# Patient Record
Sex: MALE | Race: OTHER | HISPANIC OR LATINO | Employment: OTHER | URBAN - METROPOLITAN AREA
[De-identification: names, ages, dates, MRNs, and addresses within clinical notes are randomized per-mention and may not be internally consistent; named-entity substitution may affect disease eponyms.]

---

## 2017-05-30 ENCOUNTER — HOSPITAL ENCOUNTER (EMERGENCY)
Facility: HOSPITAL | Age: 37
Discharge: HOME | End: 2017-05-30
Attending: EMERGENCY MEDICINE | Admitting: EMERGENCY MEDICINE
Payer: MEDICARE

## 2017-05-30 ENCOUNTER — APPOINTMENT (EMERGENCY)
Dept: RADIOLOGY | Facility: HOSPITAL | Age: 37
End: 2017-05-30
Payer: MEDICARE

## 2017-05-30 ENCOUNTER — APPOINTMENT (EMERGENCY)
Dept: CT IMAGING | Facility: HOSPITAL | Age: 37
End: 2017-05-30
Payer: MEDICARE

## 2017-05-30 VITALS
RESPIRATION RATE: 16 BRPM | DIASTOLIC BLOOD PRESSURE: 64 MMHG | TEMPERATURE: 98.3 F | OXYGEN SATURATION: 98 % | WEIGHT: 165 LBS | HEART RATE: 73 BPM | SYSTOLIC BLOOD PRESSURE: 111 MMHG

## 2017-05-30 DIAGNOSIS — T07.XXXA MULTIPLE CONTUSIONS: Primary | ICD-10-CM
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
## 2017-05-30 LAB
ANION GAP BLD CALC-SCNC: 17 MMOL/L (ref 4–13)
BUN BLD-MCNC: 9 MG/DL (ref 5–25)
CA-I BLD-SCNC: 1.17 MMOL/L (ref 1.12–1.32)
CHLORIDE BLD-SCNC: 99 MMOL/L (ref 100–108)
CREAT BLD-MCNC: 1 MG/DL (ref 0.6–1.3)
GFR SERPL CREATININE-BSD FRML MDRD: >60 ML/MIN/1.73SQ M
GLUCOSE SERPL-MCNC: 80 MG/DL (ref 65–140)
HCT VFR BLD CALC: 40 % (ref 36.5–49.3)
HGB BLDA-MCNC: 13.6 G/DL (ref 12–17)
PCO2 BLD: 30 MMOL/L (ref 21–32)
POTASSIUM BLD-SCNC: 3.6 MMOL/L (ref 3.5–5.3)
SODIUM BLD-SCNC: 141 MMOL/L (ref 136–145)
SPECIMEN SOURCE: ABNORMAL

## 2017-05-30 PROCEDURE — 73090 X-RAY EXAM OF FOREARM: CPT

## 2017-05-30 PROCEDURE — 70450 CT HEAD/BRAIN W/O DYE: CPT

## 2017-05-30 PROCEDURE — 96374 THER/PROPH/DIAG INJ IV PUSH: CPT

## 2017-05-30 PROCEDURE — 73610 X-RAY EXAM OF ANKLE: CPT

## 2017-05-30 PROCEDURE — 80047 BASIC METABLC PNL IONIZED CA: CPT

## 2017-05-30 PROCEDURE — 72125 CT NECK SPINE W/O DYE: CPT

## 2017-05-30 PROCEDURE — 96361 HYDRATE IV INFUSION ADD-ON: CPT

## 2017-05-30 PROCEDURE — 73560 X-RAY EXAM OF KNEE 1 OR 2: CPT

## 2017-05-30 PROCEDURE — 99284 EMERGENCY DEPT VISIT MOD MDM: CPT

## 2017-05-30 PROCEDURE — 74177 CT ABD & PELVIS W/CONTRAST: CPT

## 2017-05-30 PROCEDURE — 71260 CT THORAX DX C+: CPT

## 2017-05-30 PROCEDURE — 85014 HEMATOCRIT: CPT

## 2017-05-30 RX ORDER — KETOROLAC TROMETHAMINE 30 MG/ML
30 INJECTION, SOLUTION INTRAMUSCULAR; INTRAVENOUS ONCE
Status: COMPLETED | OUTPATIENT
Start: 2017-05-30 | End: 2017-05-30

## 2017-05-30 RX ORDER — DICLOFENAC SODIUM 25 MG/1
25 TABLET, DELAYED RELEASE ORAL 2 TIMES DAILY PRN
Qty: 15 TABLET | Refills: 0 | Status: SHIPPED | OUTPATIENT
Start: 2017-05-30 | End: 2020-06-16

## 2017-05-30 RX ORDER — TRAMADOL HYDROCHLORIDE 50 MG/1
50 TABLET ORAL EVERY 6 HOURS PRN
Qty: 15 TABLET | Refills: 0 | Status: SHIPPED | OUTPATIENT
Start: 2017-05-30 | End: 2017-05-30 | Stop reason: ALTCHOICE

## 2017-05-30 RX ADMIN — IOHEXOL 100 ML: 350 INJECTION, SOLUTION INTRAVENOUS at 19:49

## 2017-05-30 RX ADMIN — KETOROLAC TROMETHAMINE 30 MG: 30 INJECTION, SOLUTION INTRAMUSCULAR at 19:57

## 2017-05-30 RX ADMIN — SODIUM CHLORIDE 1000 ML: 0.9 INJECTION, SOLUTION INTRAVENOUS at 19:57

## 2018-09-12 ENCOUNTER — HOSPITAL ENCOUNTER (EMERGENCY)
Facility: HOSPITAL | Age: 38
Discharge: HOME | End: 2018-09-12
Attending: EMERGENCY MEDICINE
Payer: COMMERCIAL

## 2018-09-12 VITALS
RESPIRATION RATE: 18 BRPM | SYSTOLIC BLOOD PRESSURE: 136 MMHG | WEIGHT: 155 LBS | TEMPERATURE: 97.6 F | BODY MASS INDEX: 28.35 KG/M2 | DIASTOLIC BLOOD PRESSURE: 78 MMHG | HEART RATE: 88 BPM | OXYGEN SATURATION: 98 %

## 2018-09-12 DIAGNOSIS — S61.211A LACERATION OF LEFT INDEX FINGER: Primary | ICD-10-CM

## 2018-09-12 DIAGNOSIS — S61.215A LACERATION OF LEFT RING FINGER: ICD-10-CM

## 2018-09-12 PROCEDURE — 99282 EMERGENCY DEPT VISIT SF MDM: CPT

## 2018-09-13 NOTE — ED PROVIDER NOTES
History  Chief Complaint   Patient presents with    Hand Laceration     Pt states "I cut my 2 fingers with a knife" left 3rd and 4th digit  last tetanus a month ago     41 yom with very superficial laceraiton to left 3/4 fingers on volar aspect  A/P: lac, tetanus up to date  Close with steri strips glue  Laceration   Location:  Finger  Finger laceration location:  L middle finger and L ring finger  Length:  2 cm each  Depth:  Cutaneous  Bleeding: controlled    Laceration mechanism:  Metal edge  Pain details:     Quality:  Sharp    Severity:  Mild    Timing:  Constant    Progression:  Unchanged  Foreign body present:  No foreign bodies  Relieved by:  Nothing  Ineffective treatments:  None tried  Tetanus status:  Up to date  Associated symptoms: no fever and no rash        Prior to Admission Medications   Prescriptions Last Dose Informant Patient Reported? Taking? cyclobenzaprine (FLEXERIL) 10 mg tablet   No No   Sig: Take 1 tablet (10 mg total) by mouth 3 (three) times a day as needed for muscle spasms for up to 12 doses  diclofenac (VOLTAREN) 25 MG EC tablet   No No   Sig: Take 1 tablet by mouth 2 (two) times a day as needed (pain)      Facility-Administered Medications: None       Past Medical History:   Diagnosis Date    Asthma     Back pain        Past Surgical History:   Procedure Laterality Date    NO PAST SURGERIES         History reviewed  No pertinent family history  I have reviewed and agree with the history as documented  Social History   Substance Use Topics    Smoking status: Current Every Day Smoker     Packs/day: 0 00     Types: Cigarettes    Smokeless tobacco: Never Used    Alcohol use Yes        Review of Systems   Constitutional: Negative for chills, fatigue and fever  Eyes: Negative for photophobia and visual disturbance  Respiratory: Negative for cough and shortness of breath  Cardiovascular: Negative for chest pain, palpitations and leg swelling  Gastrointestinal: Negative for diarrhea, nausea and vomiting  Endocrine: Negative for polydipsia and polyuria  Genitourinary: Negative for decreased urine volume, difficulty urinating, dysuria and frequency  Musculoskeletal: Negative for back pain, neck pain and neck stiffness  Skin: Positive for wound  Negative for color change and rash  Allergic/Immunologic: Negative for environmental allergies and immunocompromised state  Neurological: Negative for dizziness and headaches  Hematological: Negative for adenopathy  Does not bruise/bleed easily  Psychiatric/Behavioral: Negative for dysphoric mood  The patient is not nervous/anxious  Physical Exam  Physical Exam   Constitutional: He is oriented to person, place, and time  He appears well-developed  HENT:   Head: Normocephalic and atraumatic  Right Ear: External ear normal    Left Ear: External ear normal    Mouth/Throat: Oropharynx is clear and moist    Eyes: Conjunctivae and EOM are normal  Pupils are equal, round, and reactive to light  Neck: Normal range of motion  Neck supple  No JVD present  No thyromegaly present  Cardiovascular: Normal rate, regular rhythm and normal heart sounds  Exam reveals no gallop and no friction rub  No murmur heard  Pulmonary/Chest: Effort normal and breath sounds normal  No respiratory distress  He has no wheezes  He has no rales  Abdominal: Soft  Bowel sounds are normal  He exhibits no distension  There is no rebound and no guarding  Musculoskeletal: Normal range of motion  He exhibits no edema  1 cm laceration on distal portion volar aspect horizonatlly on 3/4th left digits  Not through dermis   Lymphadenopathy:     He has no cervical adenopathy  Neurological: He is alert and oriented to person, place, and time  No cranial nerve deficit  Skin: Skin is warm  Psychiatric: He has a normal mood and affect  His behavior is normal    Nursing note and vitals reviewed        Vital Signs  ED Triage Vitals [09/12/18 2303]   Temperature Pulse Respirations Blood Pressure SpO2   97 6 °F (36 4 °C) 88 18 136/78 98 %      Temp src Heart Rate Source Patient Position - Orthostatic VS BP Location FiO2 (%)   -- -- -- -- --      Pain Score       3           Vitals:    09/12/18 2303   BP: 136/78   Pulse: 88       Visual Acuity      ED Medications  Medications - No data to display    Diagnostic Studies  Results Reviewed     None                 No orders to display              Procedures  Lac Repair  Date/Time: 9/12/2018 11:53 AM  Performed by: Holli Cornelius by: Jalen May   Consent: Verbal consent obtained    Risks and benefits: risks, benefits and alternatives were discussed  Patient understanding: patient states understanding of the procedure being performed  Patient identity confirmed: verbally with patient  Body area: upper extremity  Location details: left hand  Laceration length: 3 (3) cm  Tendon involvement: none  Nerve involvement: none  Vascular damage: no    Wound Dehiscence:  Superficial Wound Dehiscence: simple closure      Procedure Details:  Irrigation solution: saline  Amount of cleaning: standard  Debridement: none  Degree of undermining: none  Skin closure: Steri-Strips and glue  Number of sutures: 2  Approximation: close  Approximation difficulty: simple  Patient tolerance: Patient tolerated the procedure well with no immediate complications             Phone Contacts  ED Phone Contact    ED Course                               MDM  Number of Diagnoses or Management Options  Laceration of left index finger: new and requires workup  Laceration of left ring finger: new and requires workup  Patient Progress  Patient progress: stable    CritCare Time    Disposition  Final diagnoses:   Laceration of left index finger   Laceration of left ring finger     Time reflects when diagnosis was documented in both MDM as applicable and the Disposition within this note     Time User Action Codes Description Comment    9/12/2018 11:20 PM Honey Moise Add [B79 609E] Laceration of left index finger     9/12/2018 11:20 PM Honey Moise Add [G70 [id number]] Laceration of left ring finger       ED Disposition     ED Disposition Condition Comment    Discharge  Keo Irving discharge to home/self care  Condition at discharge: Good        Follow-up Information    None         Discharge Medication List as of 9/12/2018 11:21 PM      CONTINUE these medications which have NOT CHANGED    Details   cyclobenzaprine (FLEXERIL) 10 mg tablet Take 1 tablet (10 mg total) by mouth 3 (three) times a day as needed for muscle spasms for up to 12 doses  , Starting 7/19/2016, Until Discontinued, Print      diclofenac (VOLTAREN) 25 MG EC tablet Take 1 tablet by mouth 2 (two) times a day as needed (pain), Starting 5/30/2017, Until Discontinued, Print           No discharge procedures on file      ED Provider  Electronically Signed by           Eliana English DO  09/14/18 6068

## 2018-09-13 NOTE — DISCHARGE INSTRUCTIONS
Finger Laceration   WHAT YOU NEED TO KNOW:   A finger laceration is a deep cut in your skin  It is often caused by a sharp object, such as a knife, or blunt force to your finger  Your blood vessels, bones, joints, tendons, or nerves may also be injured  DISCHARGE INSTRUCTIONS:   Return to the emergency department if:   · Your wound comes apart  · Blood soaks through your bandage  · You have severe pain in your finger or hand  · Your finger is pale and cold  · You have sudden trouble moving your finger  · Your swelling suddenly gets worse  · You have red streaks on your skin coming from your wound  Contact your healthcare provider or hand specialist if:   · You have new numbness or tingling  · Your finger feels warm, looks swollen or red, and is draining pus  · You have a fever  · You have questions or concerns about your condition or care  Medicines: You may  need any of the following:  · Antibiotics  help prevent a bacterial infection  · Acetaminophen  decreases pain and fever  It is available without a doctor's order  Ask how much to take and how often to take it  Follow directions  Read the labels of all other medicines you are using to see if they also contain acetaminophen, or ask your doctor or pharmacist  Acetaminophen can cause liver damage if not taken correctly  Do not use more than 4 grams (4,000 milligrams) total of acetaminophen in one day  · Prescription pain medicine  may be given  Ask your healthcare provider how to take this medicine safely  Some prescription pain medicines contain acetaminophen  Do not take other medicines that contain acetaminophen without talking to your healthcare provider  Too much acetaminophen may cause liver damage  Prescription pain medicine may cause constipation  Ask your healthcare provider how to prevent or treat constipation  · Take your medicine as directed    Contact your healthcare provider if you think your medicine is not helping or if you have side effects  Tell him or her if you are allergic to any medicine  Keep a list of the medicines, vitamins, and herbs you take  Include the amounts, and when and why you take them  Bring the list or the pill bottles to follow-up visits  Carry your medicine list with you in case of an emergency  Self-care:   · Apply ice  on your finger for 15 to 20 minutes every hour or as directed  Use an ice pack, or put crushed ice in a plastic bag  Cover it with a towel before you apply it to your skin  Ice helps prevent tissue damage and decreases swelling and pain  · Elevate  your hand above the level of your heart as often as you can  This will help decrease swelling and pain  Prop your hand on pillows or blankets to keep it elevated comfortably  · Wear your splint as directed  A splint will decrease movement and stress on your wound  The splint may help your wound heal faster  Ask your healthcare provider how to apply and remove a splint  · Apply ointments to decrease scarring  Do not apply ointments until your healthcare provider says it is okay  You may need to wait until your wound is healed  Ask which ointment to buy and how often to use it  Wound care:   · Do not get your wound wet until your healthcare provider says it is okay  Do not soak your hand in water  Do not go swimming until your healthcare provider says it is okay  When your healthcare provider says it is okay, carefully wash around the wound with soap and water  Let soap and water run over your wound  Gently pat the area dry or allow it to air dry  · Change your bandages when they get wet, dirty, or after washing  Apply new, clean bandages as directed  Do not apply elastic bandages or tape too tightly  Do not put powders or lotions on your wound  · Apply antibiotic ointment as directed  Your healthcare provider may give you antibiotic ointment to put over your wound if you have stitches   If you have Strips-Strips over your wound, let them dry up and fall off on their own  If they do not fall off within 14 days, gently remove them  If you have glue over your wound, do not remove or pick at it  If your glue comes off, do not replace it with glue that you have at home  · Check your wound every day for signs of infection  Signs of infection include swelling, redness, or pus  Follow up with your healthcare provider or hand specialist in 2 days:  Write down your questions so you remember to ask them during your visits  © 2017 [employer]  Information is for End User's use only and may not be sold, redistributed or otherwise used for commercial purposes  All illustrations and images included in CareNotes® are the copyrighted property of [employer] , [employer]  or Jairo Rodriguez  The above information is an  only  It is not intended as medical advice for individual conditions or treatments  Talk to your doctor, nurse or pharmacist before following any medical regimen to see if it is safe and effective for you

## 2019-02-14 ENCOUNTER — HOSPITAL ENCOUNTER (EMERGENCY)
Facility: HOSPITAL | Age: 39
Discharge: HOME | End: 2019-02-14
Attending: EMERGENCY MEDICINE
Payer: COMMERCIAL

## 2019-02-14 VITALS
SYSTOLIC BLOOD PRESSURE: 134 MMHG | RESPIRATION RATE: 18 BRPM | BODY MASS INDEX: 28.35 KG/M2 | DIASTOLIC BLOOD PRESSURE: 78 MMHG | HEART RATE: 87 BPM | TEMPERATURE: 98.4 F | OXYGEN SATURATION: 94 % | WEIGHT: 154.98 LBS

## 2019-02-14 DIAGNOSIS — J40 BRONCHITIS: Primary | ICD-10-CM

## 2019-02-14 DIAGNOSIS — J45.901 ASTHMA EXACERBATION: ICD-10-CM

## 2019-02-14 PROCEDURE — 99283 EMERGENCY DEPT VISIT LOW MDM: CPT

## 2019-02-14 PROCEDURE — 94640 AIRWAY INHALATION TREATMENT: CPT

## 2019-02-14 RX ORDER — ALBUTEROL SULFATE 2.5 MG/3ML
2.5 SOLUTION RESPIRATORY (INHALATION) ONCE
Status: COMPLETED | OUTPATIENT
Start: 2019-02-14 | End: 2019-02-14

## 2019-02-14 RX ORDER — BENZONATATE 100 MG/1
100 CAPSULE ORAL EVERY 8 HOURS
Qty: 21 CAPSULE | Refills: 0 | Status: SHIPPED | OUTPATIENT
Start: 2019-02-14 | End: 2020-06-16

## 2019-02-14 RX ORDER — GUAIFENESIN 100 MG/5ML
200 SYRUP ORAL 3 TIMES DAILY PRN
Qty: 120 ML | Refills: 0 | Status: SHIPPED | OUTPATIENT
Start: 2019-02-14 | End: 2019-02-24

## 2019-02-14 RX ORDER — AZITHROMYCIN 250 MG/1
TABLET, FILM COATED ORAL
Qty: 6 TABLET | Refills: 0 | Status: SHIPPED | OUTPATIENT
Start: 2019-02-14 | End: 2019-02-18

## 2019-02-14 RX ORDER — MAGNESIUM HYDROXIDE/ALUMINUM HYDROXICE/SIMETHICONE 120; 1200; 1200 MG/30ML; MG/30ML; MG/30ML
30 SUSPENSION ORAL ONCE
Status: COMPLETED | OUTPATIENT
Start: 2019-02-14 | End: 2019-02-14

## 2019-02-14 RX ADMIN — ALBUTEROL SULFATE 2.5 MG: 2.5 SOLUTION RESPIRATORY (INHALATION) at 21:34

## 2019-02-14 RX ADMIN — ALUMINUM HYDROXIDE, MAGNESIUM HYDROXIDE, AND SIMETHICONE 30 ML: 200; 200; 20 SUSPENSION ORAL at 22:44

## 2019-02-14 RX ADMIN — LIDOCAINE HYDROCHLORIDE 15 ML: 20 SOLUTION ORAL; TOPICAL at 22:44

## 2019-02-14 RX ADMIN — IPRATROPIUM BROMIDE 0.5 MG: 0.5 SOLUTION RESPIRATORY (INHALATION) at 21:34

## 2019-02-15 NOTE — ED PROVIDER NOTES
History  Chief Complaint   Patient presents with    URI     pt reports cough, congestion and chest pain with cough for a few weeks now, pt seen at urgent cares and PCP for same and dx with "bronchitis, pneumonia or whooping cough", appears in no distress     71-year-old male presents the ER with his wife for chest pain, chest tightness, wheezing, cough, headaches, dizziness, subjective fevers the patient states started a few weeks ago  Patient has a history of asthma and states that he has been using his inhaler more and was given a prescription for a nebulizer to use every 4 hours as needed due to his wheezing, chest tightness, cough and chest pain that started since he became sick  Patient was seen by his PCP and also at Urgent Care on February 12 and diagnosed with bronchitis  Wife states that at that time he was checked for strep throat and flu and both were negative  Patient was also seen this morning at [facility] and had a workup which included an EKG, chest x-ray, pertussis lab, CBC, CMP, troponin  Wife states that all the labs were negative and that pneumonia and will being cough or ruled out  Wife states that he has been taking approximately 3 days of prednisone and using his inhalers and nebulizers  Patient was prescribed Bactrim this morning from [facility] and wife states that she has not filled that prescription at this time and that she question the doctor on why he was giving a Bactrim prescription and is unsure of what is being covered  Patient's wife states that she would does work as a nurse  Wife states that patient has been taking OTC medicine for symptom relief without much improvement  Wife states the patient is a smoker but has had decreased his smoking since his symptoms began  Wife states that she is concerned because patient has had symptoms for several weeks without much improvement and states that he is not acting like himself    Patient does have prescription for as escitalopram but wife states that he does not take it on a regular basis only when needed  Prior to Admission Medications   Prescriptions Last Dose Informant Patient Reported? Taking? cyclobenzaprine (FLEXERIL) 10 mg tablet   No No   Sig: Take 1 tablet (10 mg total) by mouth 3 (three) times a day as needed for muscle spasms for up to 12 doses  diclofenac (VOLTAREN) 25 MG EC tablet   No No   Sig: Take 1 tablet by mouth 2 (two) times a day as needed (pain)      Facility-Administered Medications: None       Past Medical History:   Diagnosis Date    Asthma     Back pain     Psychiatric disorder        Past Surgical History:   Procedure Laterality Date    HAND SURGERY      NO PAST SURGERIES         History reviewed  No pertinent family history  I have reviewed and agree with the history as documented  Social History     Tobacco Use    Smoking status: Current Every Day Smoker     Packs/day: 0 00     Types: Cigarettes    Smokeless tobacco: Never Used   Substance Use Topics    Alcohol use: Yes    Drug use: Yes     Types: Marijuana        Review of Systems   Constitutional: Positive for chills and fever  HENT: Positive for congestion, rhinorrhea and sore throat  Respiratory: Positive for cough, chest tightness, shortness of breath and wheezing  Cardiovascular: Positive for chest pain  Negative for palpitations  Gastrointestinal: Negative for abdominal pain, nausea and vomiting  Skin: Negative for rash  Neurological: Positive for dizziness and headaches  Negative for weakness, light-headedness and numbness  All other systems reviewed and are negative  Physical Exam  Physical Exam   Constitutional: He is oriented to person, place, and time  He appears well-developed and well-nourished  He appears distressed  HENT:   Head: Normocephalic and atraumatic     Right Ear: Tympanic membrane normal    Left Ear: Tympanic membrane normal    Nose: Mucosal edema and rhinorrhea present  Mouth/Throat: Uvula is midline, oropharynx is clear and moist and mucous membranes are normal    Eyes: Pupils are equal, round, and reactive to light  Conjunctivae and lids are normal    Neck: Normal range of motion  Cardiovascular: Normal rate, regular rhythm, normal heart sounds and intact distal pulses  Pulmonary/Chest: Effort normal  He has wheezes  Abdominal: Soft  Bowel sounds are normal  There is no tenderness  Patient did not have any abdominal pain on exam but after breathing treatment stated that he felt an epigastric pain at the top of the abdomen  Pain not reproducible with palpation, GI cocktail given to patient and noted improvement but patient approximately 15 minutes after treatment was given  Musculoskeletal: Normal range of motion  Neurological: He is alert and oriented to person, place, and time  He has normal strength  No cranial nerve deficit or sensory deficit  GCS eye subscore is 4  GCS verbal subscore is 5  GCS motor subscore is 6  Skin: Skin is warm, dry and intact  Capillary refill takes less than 2 seconds  No rash noted  He is not diaphoretic  Nursing note and vitals reviewed        Vital Signs  ED Triage Vitals [02/14/19 2048]   Temperature Pulse Respirations Blood Pressure SpO2   98 4 °F (36 9 °C) 87 18 134/78 94 %      Temp src Heart Rate Source Patient Position - Orthostatic VS BP Location FiO2 (%)   -- -- -- -- --      Pain Score       --           Vitals:    02/14/19 2048   BP: 134/78   Pulse: 87       Visual Acuity      ED Medications  Medications   albuterol inhalation solution 2 5 mg (2 5 mg Nebulization Given 2/14/19 2134)   ipratropium (ATROVENT) 0 02 % inhalation solution 0 5 mg (0 5 mg Nebulization Given 2/14/19 2134)   aluminum-magnesium hydroxide-simethicone (MYLANTA) 200-200-20 mg/5 mL oral suspension 30 mL (30 mL Oral Given 2/14/19 2244)   lidocaine viscous (XYLOCAINE) 2 % mucosal solution 15 mL (15 mL Swish & Swallow Given 2/14/19 2244)       Diagnostic Studies  Results Reviewed     None                 No orders to display              Procedures  Procedures       Phone Contacts  ED Phone Contact    ED Course  ED Course as of Feb 18 0536   Thu Feb 14, 2019   2313 Pt states that the GI cocktail improved his stomach pain  MDM  Number of Diagnoses or Management Options  Asthma exacerbation: new and does not require workup  Bronchitis: new and does not require workup  Diagnosis management comments: Have and x-ray reviewed from [facility] notes  Discussed with patient and his wife that due to full workup done that morning no lab work or repeat x-rays will beDone at this time  Discussed with patient and his wife that if any symptoms worsen or new symptoms develop they should return to the ER for further evaluation  Patient and his wife verbalized understanding of instructions  Patient will be given a prescription for azithromycin to cover lungs due to productive sounding cough  Wife states the prescription for Bactrim was not filled  Patient has been taking prednisone and should continue prescription as prescribed  Patient should also continue using his albuterol inhaler and nebulizer treatments  Patient Progress  Patient progress: stable      Disposition  Final diagnoses:   Bronchitis   Asthma exacerbation     Time reflects when diagnosis was documented in both MDM as applicable and the Disposition within this note     Time User Action Codes Description Comment    2/14/2019 11:13 PM Lindsay  Add [J40] Bronchitis     2/14/2019 11:14 PM Lindsay  Add [L43 [id number]] Asthma exacerbation       ED Disposition     ED Disposition Condition Date/Time Comment    Discharge Stable u Feb 14, 2019 11:13 PM Jenna Krishna discharge to home/self care              Follow-up Information     Follow up With Specialties Details Why [address] [facility],, [place] Physician Assistant Schedule an appointment as soon as possible for a visit in 1 day For further evaluation of symptoms [address] [place] [id number] [phone]      with pulmonologist  Schedule an appointment as soon as possible for a visit  For further evaluation of symptoms           Discharge Medication List as of 2/14/2019 11:20 PM      START taking these medications    Details   azithromycin (ZITHROMAX) 250 mg tablet Take 2 tablets today then 1 tablet daily x 4 days, Normal      benzonatate (TESSALON PERLES) 100 mg capsule Take 1 capsule (100 mg total) by mouth every 8 (eight) hours, Starting Thu 2/14/2019, Normal      guaiFENesin (ROBITUSSIN) 100 mg/5 mL syrup Take 10 mL (200 mg total) by mouth 3 (three) times a day as needed for cough for up to 10 days, Starting Thu 2/14/2019, Until Sun 2/24/2019, Normal         CONTINUE these medications which have NOT CHANGED    Details   cyclobenzaprine (FLEXERIL) 10 mg tablet Take 1 tablet (10 mg total) by mouth 3 (three) times a day as needed for muscle spasms for up to 12 doses  , Starting 7/19/2016, Until Discontinued, Print      diclofenac (VOLTAREN) 25 MG EC tablet Take 1 tablet by mouth 2 (two) times a day as needed (pain), Starting 5/30/2017, Until Discontinued, Print           No discharge procedures on file      ED Provider  Electronically Signed by           Whitney Edwards PA-C  02/18/19 7753

## 2019-02-15 NOTE — DISCHARGE INSTRUCTIONS
Schedule an appointment with his family doctor to be seen for his worsening asthma and bronchitis  If symptoms worsen or new symptoms develop return to the ER for further evaluation

## 2020-06-16 ENCOUNTER — HOSPITAL ENCOUNTER (EMERGENCY)
Facility: HOSPITAL | Age: 40
Discharge: HOME | End: 2020-06-16
Attending: EMERGENCY MEDICINE
Payer: MEDICARE

## 2020-06-16 ENCOUNTER — APPOINTMENT (EMERGENCY)
Dept: RADIOLOGY | Facility: HOSPITAL | Age: 40
End: 2020-06-16
Payer: MEDICARE

## 2020-06-16 VITALS
SYSTOLIC BLOOD PRESSURE: 147 MMHG | TEMPERATURE: 97.6 F | WEIGHT: 150.57 LBS | HEART RATE: 77 BPM | RESPIRATION RATE: 16 BRPM | OXYGEN SATURATION: 99 % | BODY MASS INDEX: 27.54 KG/M2 | DIASTOLIC BLOOD PRESSURE: 81 MMHG

## 2020-06-16 DIAGNOSIS — T14.8XXA CRUSH INJURY: ICD-10-CM

## 2020-06-16 DIAGNOSIS — S61.012A LACERATION OF LEFT THUMB: Primary | ICD-10-CM

## 2020-06-16 PROCEDURE — 99283 EMERGENCY DEPT VISIT LOW MDM: CPT

## 2020-06-16 PROCEDURE — 99284 EMERGENCY DEPT VISIT MOD MDM: CPT | Performed by: PHYSICIAN ASSISTANT

## 2020-06-16 PROCEDURE — 73140 X-RAY EXAM OF FINGER(S): CPT

## 2020-06-16 RX ORDER — TRAMADOL HYDROCHLORIDE 200 MG/1
200 TABLET, EXTENDED RELEASE ORAL DAILY
COMMUNITY

## 2020-06-16 RX ORDER — NAPROXEN 500 MG/1
500 TABLET ORAL 2 TIMES DAILY WITH MEALS
Qty: 10 TABLET | Refills: 0 | Status: SHIPPED | OUTPATIENT
Start: 2020-06-16 | End: 2020-06-21

## 2020-06-16 RX ORDER — IBUPROFEN 600 MG/1
600 TABLET ORAL ONCE
Status: COMPLETED | OUTPATIENT
Start: 2020-06-16 | End: 2020-06-16

## 2020-06-16 RX ORDER — AMOXICILLIN AND CLAVULANATE POTASSIUM 875; 125 MG/1; MG/1
1 TABLET, FILM COATED ORAL EVERY 12 HOURS
Qty: 10 TABLET | Refills: 0 | Status: SHIPPED | OUTPATIENT
Start: 2020-06-16 | End: 2020-06-21

## 2020-06-16 RX ORDER — TRAMADOL HYDROCHLORIDE 50 MG/1
50 TABLET ORAL EVERY 6 HOURS PRN
COMMUNITY

## 2020-06-16 RX ADMIN — IBUPROFEN 600 MG: 600 TABLET ORAL at 13:28

## 2020-10-01 ENCOUNTER — OFFICE VISIT (OUTPATIENT)
Dept: URGENT CARE | Facility: MEDICAL CENTER | Age: 40
End: 2020-10-01
Payer: MEDICARE

## 2020-10-01 VITALS
TEMPERATURE: 97.7 F | SYSTOLIC BLOOD PRESSURE: 118 MMHG | OXYGEN SATURATION: 97 % | RESPIRATION RATE: 20 BRPM | DIASTOLIC BLOOD PRESSURE: 78 MMHG | HEART RATE: 62 BPM

## 2020-10-01 DIAGNOSIS — Z20.822 CLOSE EXPOSURE TO COVID-19 VIRUS: Primary | ICD-10-CM

## 2020-10-01 PROCEDURE — U0003 INFECTIOUS AGENT DETECTION BY NUCLEIC ACID (DNA OR RNA); SEVERE ACUTE RESPIRATORY SYNDROME CORONAVIRUS 2 (SARS-COV-2) (CORONAVIRUS DISEASE [COVID-19]), AMPLIFIED PROBE TECHNIQUE, MAKING USE OF HIGH THROUGHPUT TECHNOLOGIES AS DESCRIBED BY CMS-2020-01-R: HCPCS | Performed by: PHYSICIAN ASSISTANT

## 2020-10-01 PROCEDURE — G0463 HOSPITAL OUTPT CLINIC VISIT: HCPCS | Performed by: PHYSICIAN ASSISTANT

## 2020-10-01 PROCEDURE — 99213 OFFICE O/P EST LOW 20 MIN: CPT | Performed by: PHYSICIAN ASSISTANT

## 2020-10-01 RX ORDER — BUDESONIDE AND FORMOTEROL FUMARATE DIHYDRATE 160; 4.5 UG/1; UG/1
2 AEROSOL RESPIRATORY (INHALATION) 2 TIMES DAILY
COMMUNITY
Start: 2016-01-12

## 2020-10-01 RX ORDER — DICYCLOMINE HCL 20 MG
TABLET ORAL
COMMUNITY

## 2020-10-01 RX ORDER — ALPRAZOLAM 0.5 MG/1
TABLET ORAL
COMMUNITY
Start: 2020-08-31

## 2020-10-01 RX ORDER — ALBUTEROL SULFATE 90 UG/1
2 AEROSOL, METERED RESPIRATORY (INHALATION) EVERY 6 HOURS PRN
Qty: 18 G | Refills: 0 | Status: SHIPPED | OUTPATIENT
Start: 2020-10-01

## 2020-10-02 LAB — SARS-COV-2 RNA SPEC QL NAA+PROBE: NOT DETECTED

## 2020-12-27 ENCOUNTER — APPOINTMENT (EMERGENCY)
Dept: CT IMAGING | Facility: HOSPITAL | Age: 40
End: 2020-12-27
Payer: COMMERCIAL

## 2020-12-27 ENCOUNTER — HOSPITAL ENCOUNTER (EMERGENCY)
Facility: HOSPITAL | Age: 40
Discharge: HOME | End: 2020-12-27
Attending: EMERGENCY MEDICINE | Admitting: EMERGENCY MEDICINE
Payer: COMMERCIAL

## 2020-12-27 VITALS
OXYGEN SATURATION: 100 % | BODY MASS INDEX: 28.55 KG/M2 | HEART RATE: 69 BPM | DIASTOLIC BLOOD PRESSURE: 71 MMHG | TEMPERATURE: 98.7 F | SYSTOLIC BLOOD PRESSURE: 137 MMHG | WEIGHT: 156.09 LBS | RESPIRATION RATE: 16 BRPM

## 2020-12-27 DIAGNOSIS — M54.9 BACK PAIN: Primary | ICD-10-CM

## 2020-12-27 DIAGNOSIS — M54.9 MUSCULOSKELETAL BACK PAIN: ICD-10-CM

## 2020-12-27 LAB
BILIRUB UR QL STRIP: NEGATIVE
CLARITY UR: CLEAR
COLOR UR: YELLOW
GLUCOSE UR STRIP-MCNC: NEGATIVE MG/DL
HGB UR QL STRIP.AUTO: NEGATIVE
KETONES UR STRIP-MCNC: NEGATIVE MG/DL
LEUKOCYTE ESTERASE UR QL STRIP: NEGATIVE
NITRITE UR QL STRIP: NEGATIVE
PH UR STRIP.AUTO: 7 [PH] (ref 4.5–8)
PROT UR STRIP-MCNC: NEGATIVE MG/DL
SP GR UR STRIP.AUTO: 1.02 (ref 1–1.03)
UROBILINOGEN UR QL STRIP.AUTO: 1 E.U./DL

## 2020-12-27 PROCEDURE — 99284 EMERGENCY DEPT VISIT MOD MDM: CPT | Performed by: EMERGENCY MEDICINE

## 2020-12-27 PROCEDURE — 99284 EMERGENCY DEPT VISIT MOD MDM: CPT

## 2020-12-27 PROCEDURE — 74176 CT ABD & PELVIS W/O CONTRAST: CPT

## 2020-12-27 PROCEDURE — 96372 THER/PROPH/DIAG INJ SC/IM: CPT

## 2020-12-27 PROCEDURE — 81003 URINALYSIS AUTO W/O SCOPE: CPT

## 2020-12-27 RX ORDER — KETOROLAC TROMETHAMINE 30 MG/ML
30 INJECTION, SOLUTION INTRAMUSCULAR; INTRAVENOUS ONCE
Status: COMPLETED | OUTPATIENT
Start: 2020-12-27 | End: 2020-12-27

## 2020-12-27 RX ORDER — METHOCARBAMOL 500 MG/1
500 TABLET, FILM COATED ORAL 2 TIMES DAILY
Qty: 20 TABLET | Refills: 0 | Status: SHIPPED | OUTPATIENT
Start: 2020-12-27 | End: 2021-02-07 | Stop reason: SDUPTHER

## 2020-12-27 RX ORDER — LIDOCAINE 50 MG/G
1 PATCH TOPICAL ONCE
Status: DISCONTINUED | OUTPATIENT
Start: 2020-12-27 | End: 2020-12-28 | Stop reason: HOSPADM

## 2020-12-27 RX ORDER — METHOCARBAMOL 500 MG/1
500 TABLET, FILM COATED ORAL ONCE
Status: COMPLETED | OUTPATIENT
Start: 2020-12-27 | End: 2020-12-27

## 2020-12-27 RX ADMIN — KETOROLAC TROMETHAMINE 30 MG: 30 INJECTION, SOLUTION INTRAMUSCULAR at 21:28

## 2020-12-27 RX ADMIN — LIDOCAINE 1 PATCH: 50 PATCH CUTANEOUS at 20:59

## 2020-12-27 RX ADMIN — METHOCARBAMOL 500 MG: 500 TABLET ORAL at 20:58

## 2021-02-07 ENCOUNTER — HOSPITAL ENCOUNTER (EMERGENCY)
Facility: HOSPITAL | Age: 41
Discharge: HOME | End: 2021-02-07
Attending: EMERGENCY MEDICINE
Payer: MEDICARE

## 2021-02-07 VITALS
HEART RATE: 69 BPM | RESPIRATION RATE: 17 BRPM | OXYGEN SATURATION: 100 % | TEMPERATURE: 98.3 F | DIASTOLIC BLOOD PRESSURE: 65 MMHG | SYSTOLIC BLOOD PRESSURE: 137 MMHG

## 2021-02-07 DIAGNOSIS — M54.9 MUSCULOSKELETAL BACK PAIN: ICD-10-CM

## 2021-02-07 DIAGNOSIS — M54.10 RADICULOPATHY: ICD-10-CM

## 2021-02-07 DIAGNOSIS — M54.9 BACK PAIN: Primary | ICD-10-CM

## 2021-02-07 PROCEDURE — 99283 EMERGENCY DEPT VISIT LOW MDM: CPT

## 2021-02-07 PROCEDURE — 99285 EMERGENCY DEPT VISIT HI MDM: CPT | Performed by: EMERGENCY MEDICINE

## 2021-02-07 PROCEDURE — 96372 THER/PROPH/DIAG INJ SC/IM: CPT

## 2021-02-07 RX ORDER — KETOROLAC TROMETHAMINE 30 MG/ML
30 INJECTION, SOLUTION INTRAMUSCULAR; INTRAVENOUS ONCE
Status: COMPLETED | OUTPATIENT
Start: 2021-02-07 | End: 2021-02-07

## 2021-02-07 RX ORDER — LIDOCAINE 50 MG/G
1 PATCH TOPICAL ONCE
Status: DISCONTINUED | OUTPATIENT
Start: 2021-02-07 | End: 2021-02-07 | Stop reason: HOSPADM

## 2021-02-07 RX ORDER — METHOCARBAMOL 500 MG/1
500 TABLET, FILM COATED ORAL 2 TIMES DAILY
Qty: 20 TABLET | Refills: 0 | Status: SHIPPED | OUTPATIENT
Start: 2021-02-07

## 2021-02-07 RX ORDER — METHOCARBAMOL 500 MG/1
500 TABLET, FILM COATED ORAL ONCE
Status: COMPLETED | OUTPATIENT
Start: 2021-02-07 | End: 2021-02-07

## 2021-02-07 RX ADMIN — LIDOCAINE 1 PATCH: 50 PATCH TOPICAL at 01:18

## 2021-02-07 RX ADMIN — METHOCARBAMOL 500 MG: 500 TABLET ORAL at 01:16

## 2021-02-07 RX ADMIN — KETOROLAC TROMETHAMINE 30 MG: 30 INJECTION, SOLUTION INTRAMUSCULAR at 01:16

## 2021-02-07 NOTE — ED PROVIDER NOTES
Pt Name: Maryln Burkitt  MRN: [id number]  [place] 1980  Age/Sex: 47 y o  male  Date of evaluation: 2/7/2021  PCP: Devonte Crocker PA-C    CHIEF COMPLAINT    Chief Complaint   Patient presents with    Back Pain     low back pain that radiates down right leg, does a lot of heavy lifting for work, able to ambulate         HPI    Lorene Patel presents to the Emergency Department complaining of back pain  He has had chronic back pain and has been here in the ED for it in the past   The medications that he was given last time here did help and now he is out of them  He was not needing anything until he shoveled snow the last few days  He now has radiating pain down his right leg as well  No bowel or bladder incontinence  No other complaints  HPI      Past Medical and Surgical History    Past Medical History:   Diagnosis Date    Asthma     Back pain     Psychiatric disorder        Past Surgical History:   Procedure Laterality Date    HAND SURGERY      NO PAST SURGERIES         History reviewed  No pertinent family history  Social History     Tobacco Use    Smoking status: Current Every Day Smoker     Packs/day: 0 25     Types: Cigarettes    Smokeless tobacco: Never Used   Substance Use Topics    Alcohol use: Yes    Drug use: Yes     Types: Marijuana           Allergies    Allergies   Allergen Reactions    Nsaids      Gastritis     Oxycodone-Acetaminophen     Prednisone Hives     "puts me in bad mood"    Percocet [Oxycodone-Acetaminophen] Rash    Vicodin [Hydrocodone-Acetaminophen] Rash       Home Medications    Prior to Admission medications    Medication Sig Start Date End Date Taking?  Authorizing Provider   albuterol (Ventolin HFA) 90 mcg/act inhaler Inhale 2 puffs every 6 (six) hours as needed for wheezing or shortness of breath 10/1/20   Isak Dodge PA-C   ALPRAZolam (XANAX) 0 5 mg tablet TAKE 1 TABLET(0 5 MG) BY MOUTH THREE TIMES DAILY AS NEEDED FOR ANXIETY 8/31/20 Historical Provider, MD   budesonide-formoterol (Symbicort) 160-4 5 mcg/act inhaler Inhale 2 puffs 2 (two) times a day 1/12/16   Historical Provider, MD   Diclofenac Sodium (VOLTAREN) 1 % Apply 2 g topically 4 (four) times a day 2/7/21 Thurston Points, DO   dicyclomine (BENTYL) 20 mg tablet dicyclomine 20 mg tablet    Historical Provider, MD   Influenza Vac Typ A&B Surf Ant SUSP Fluvirin [id number] 45 mcg (15 mcg x 3)/0 5 mL intramuscular suspension    Historical Provider, MD   methocarbamol (ROBAXIN) 500 mg tablet Take 1 tablet (500 mg total) by mouth 2 (two) times a day 2/7/21 Thurston Points, DO   naproxen (EC NAPROSYN) 500 MG EC tablet Take 1 tablet (500 mg total) by mouth 2 (two) times a day with meals for 5 days 6/16/20 6/21/20  Enrrique Lopes PA-C   traMADol (ULTRAM) 50 mg tablet Take 50 mg by mouth every 6 (six) hours as needed for moderate pain    Historical Provider, MD   traMADol (ULTRAM-ER) 200 MG 24 hr tablet Take 200 mg by mouth daily    Historical Provider, MD   Diclofenac Sodium (VOLTAREN) 1 % Apply 2 g topically 4 (four) times a day 12/27/20 2/7/21 Thurston Points, DO   methocarbamol (ROBAXIN) 500 mg tablet Take 1 tablet (500 mg total) by mouth 2 (two) times a day 12/27/20 2/7/21 Thurston Points, DO           Review of Systems    Review of Systems   Constitutional: Negative for chills and fever  HENT: Negative for ear pain and sore throat  Eyes: Negative for pain and visual disturbance  Respiratory: Negative for cough and shortness of breath  Cardiovascular: Negative for chest pain and palpitations  Gastrointestinal: Negative for abdominal pain and vomiting  Genitourinary: Negative for dysuria and hematuria  Musculoskeletal: Positive for back pain  Negative for arthralgias  Skin: Negative for color change and rash  Neurological: Negative for seizures and syncope  All other systems reviewed and are negative        Physical Exam      ED Triage Vitals [02/07/21 0038]   Temperature Pulse Respirations Blood Pressure SpO2   98 3 °F (36 8 °C) 69 17 137/65 100 %      Temp src Heart Rate Source Patient Position - Orthostatic VS BP Location FiO2 (%)   -- Monitor Sitting Right arm --      Pain Score       8               Physical Exam  Vitals signs and nursing note reviewed  Constitutional:       General: He is not in acute distress  Appearance: He is well-developed  He is not diaphoretic  HENT:      Head: Normocephalic and atraumatic  Nose: Nose normal    Eyes:      Conjunctiva/sclera: Conjunctivae normal       Pupils: Pupils are equal, round, and reactive to light  Neck:      Musculoskeletal: Normal range of motion and neck supple  Cardiovascular:      Rate and Rhythm: Normal rate and regular rhythm  Heart sounds: Normal heart sounds  No murmur  No friction rub  No gallop  Pulmonary:      Effort: Pulmonary effort is normal  No respiratory distress  Breath sounds: Normal breath sounds  No wheezing or rales  Abdominal:      General: Bowel sounds are normal       Palpations: Abdomen is soft  Tenderness: There is no abdominal tenderness  There is no guarding or rebound  Musculoskeletal: Normal range of motion  Lumbar back: He exhibits pain and spasm  He exhibits normal range of motion, no tenderness, no bony tenderness, no swelling, no edema and no deformity  Skin:     General: Skin is warm and dry  Neurological:      Mental Status: He is alert and oriented to person, place, and time  Psychiatric:         Behavior: Behavior normal          Assessment and Plan    Arturo Terrazas is a 47 y o  male who presents with back pain that radiates down right leg  Physical examination remarkable for same  Differential diagnosis (not completely inclusive) includes musculoskeletal pain with associated radiculopathy   Plan will be to perform diagnostic testing and treat symptomatically  MDM    Diagnostic Results    Labs:    Results for orders placed or performed during the hospital encounter of 12/27/20   Urine Macroscopic, POC   Result Value Ref Range    Color, UA Yellow     Clarity, UA Clear     pH, UA 7 0 4 5 - 8 0    Leukocytes, UA Negative Negative    Nitrite, UA Negative Negative    Protein, UA Negative Negative mg/dl    Glucose, UA Negative Negative mg/dl    Ketones, UA Negative Negative mg/dl    Urobilinogen, UA 1 0 0 2, 1 0 E U /dl E U /dl    Bilirubin, UA Negative Negative    Blood, UA Negative Negative    Specific Gravity, UA 1 025 1 003 - 1 030       All labs reviewed and utilized in the medical decision making process    Radiology:    No orders to display       All radiology studies independently viewed by me and interpreted by the radiologist     Procedure    Procedures      ED Course of Care and Re-Assessments        Medications   ketorolac (TORADOL) injection 30 mg (30 mg Intramuscular Given 2/7/21 0116)   methocarbamol (ROBAXIN) tablet 500 mg (500 mg Oral Given 2/7/21 0116)           FINAL IMPRESSION    Final diagnoses:   Back pain   Radiculopathy         DISPOSITION/PLAN    Time reflects when diagnosis was documented in both MDM as applicable and the Disposition within this note     Time User Action Codes Description Comment    2/7/2021 12:50 AM Delmy Dumont L Add [M54 9] Back pain     2/7/2021 12:50 AM Delmy Dumont Add [M54 10] Radiculopathy     2/7/2021 12:53 AM Delmy Dumont Add [M54 9] Musculoskeletal back pain       ED Disposition     ED Disposition Condition Date/Time Comment    Discharge Stable Sun Feb 7, 2021 12:50 AM Mona Mcclendon discharge to home/self care              Follow-up Information     Follow up With Specialties Details Why Contact Info Additional [address] BABITA Faustin Physician Assistant Schedule an appointment as soon as possible for a visit   [address] [place] [address] [address] [id number]  [address] [place] [address] [facility]   [phone]            PATIENT REFERRED TO:    Lidia Payne PA-C  [address]  [facility] [address] [id number] [phone]    Schedule an appointment as soon as possible for a visit       [facility] SPECIALTY [facility]  [phone]          DISCHARGE MEDICATIONS:    Discharge Medication List as of 2/7/2021  1:03 AM      CONTINUE these medications which have CHANGED    Details   Diclofenac Sodium (VOLTAREN) 1 % Apply 2 g topically 4 (four) times a day, Starting Sun 2/7/2021, Print      methocarbamol (ROBAXIN) 500 mg tablet Take 1 tablet (500 mg total) by mouth 2 (two) times a day, Starting Sun 2/7/2021, Print         CONTINUE these medications which have NOT CHANGED    Details   albuterol (Ventolin HFA) 90 mcg/act inhaler Inhale 2 puffs every 6 (six) hours as needed for wheezing or shortness of breath, Starting u 10/1/2020, Normal      ALPRAZolam (XANAX) 0 5 mg tablet TAKE 1 TABLET(0 5 MG) BY MOUTH THREE TIMES DAILY AS NEEDED FOR ANXIETY, Historical Med      budesonide-formoterol (Symbicort) 160-4 5 mcg/act inhaler Inhale 2 puffs 2 (two) times a day, Starting Tue 1/12/2016, Historical Med      dicyclomine (BENTYL) 20 mg tablet dicyclomine 20 mg tablet, Historical Med      Influenza Vac Typ A&B Surf Ant SUSP Fluvirin [id number] 45 mcg (15 mcg x 3)/0 5 mL intramuscular suspension, Historical Med      naproxen (EC NAPROSYN) 500 MG EC tablet Take 1 tablet (500 mg total) by mouth 2 (two) times a day with meals for 5 days, Starting Tue 6/16/2020, Until Sun 6/21/2020, Print      traMADol (ULTRAM) 50 mg tablet Take 50 mg by mouth every 6 (six) hours as needed for moderate pain, Historical Med      traMADol (ULTRAM-ER) 200 MG 24 hr tablet Take 200 mg by mouth daily, Historical Med                      Blair Merino, DO Blair Merino, DO  02/07/21 8370

## 2021-02-09 ENCOUNTER — TELEPHONE (OUTPATIENT)
Dept: PHYSICAL THERAPY | Facility: OTHER | Age: 41
End: 2021-02-09

## 2021-02-09 NOTE — TELEPHONE ENCOUNTER
Call placed to the patient per [facility] referral     Voice message left for patient to call back  Phone number and hours of business provided  Patient informed that we are currently working remotely and that calls from us will be coming through as [address] [employer] phone numbers  This is the 1st attempt to reach the patient  Will defer per protocol

## 2021-02-15 ENCOUNTER — TELEPHONE (OUTPATIENT)
Dept: PHYSICAL THERAPY | Facility: OTHER | Age: 41
End: 2021-02-15

## 2021-02-15 NOTE — TELEPHONE ENCOUNTER
Call placed to the patient per [facility] referral     Voice message left for patient to call back  Phone number and hours of business provided  Patient informed that we are currently working remotely and that calls from us will be coming through as [address] [facility] phone numbers  This is the 3rd attempt to reach the patient  Referral closed

## 2021-05-11 ENCOUNTER — APPOINTMENT (EMERGENCY)
Dept: RADIOLOGY | Facility: HOSPITAL | Age: 41
End: 2021-05-11
Payer: MEDICARE

## 2021-05-11 ENCOUNTER — HOSPITAL ENCOUNTER (EMERGENCY)
Facility: HOSPITAL | Age: 41
Discharge: HOME | End: 2021-05-11
Attending: EMERGENCY MEDICINE | Admitting: EMERGENCY MEDICINE
Payer: MEDICARE

## 2021-05-11 VITALS
SYSTOLIC BLOOD PRESSURE: 124 MMHG | TEMPERATURE: 98.6 F | HEART RATE: 65 BPM | RESPIRATION RATE: 18 BRPM | OXYGEN SATURATION: 98 % | DIASTOLIC BLOOD PRESSURE: 79 MMHG

## 2021-05-11 DIAGNOSIS — M54.41 ACUTE RIGHT-SIDED LOW BACK PAIN WITH RIGHT-SIDED SCIATICA: Primary | ICD-10-CM

## 2021-05-11 PROCEDURE — 99283 EMERGENCY DEPT VISIT LOW MDM: CPT

## 2021-05-11 PROCEDURE — 72100 X-RAY EXAM L-S SPINE 2/3 VWS: CPT

## 2021-05-11 PROCEDURE — 99284 EMERGENCY DEPT VISIT MOD MDM: CPT | Performed by: PHYSICIAN ASSISTANT

## 2021-05-11 RX ORDER — ACETAMINOPHEN 325 MG/1
650 TABLET ORAL ONCE
Status: COMPLETED | OUTPATIENT
Start: 2021-05-11 | End: 2021-05-11

## 2021-05-11 RX ORDER — METHOCARBAMOL 750 MG/1
750 TABLET, FILM COATED ORAL EVERY 6 HOURS PRN
Qty: 20 TABLET | Refills: 0 | Status: SHIPPED | OUTPATIENT
Start: 2021-05-11

## 2021-05-11 RX ADMIN — ACETAMINOPHEN 650 MG: 325 TABLET, FILM COATED ORAL at 08:51

## 2021-05-11 NOTE — DISCHARGE INSTRUCTIONS
DISCHARGE INSTRUCTIONS:    FOLLOW UP WITH YOUR PRIMARY CARE PROVIDER OR THE [address] [place] [address]  MAKE AN APPOINTMENT TO BE SEEN  TAKE TYLENOL FOR PAIN  TAKE MEDICATION AS PRESCRIBED  IF RASH, SHORTNESS OF BREATH OR TROUBLE SWALLOWING, STOP TAKING THE MEDICATION AND BE SEEN  REST AND APPLY HEAT TO THE AREA  FOLLOW UP WITH THE RECOMMENDED SPINE PROGRAM     IF SYMPTOMS WORSEN OR NEW SYMPTOMS ARISE, RETURN TO THE ER TO BE SEEN

## 2021-05-11 NOTE — ED PROVIDER NOTES
History  Chief Complaint   Patient presents with    Back Pain     Lower back pain kathy villatoro down let leg x 1 day  pt reports pain began after lifting heavy boxes   pt has full sensation and movement      40y  o male with PMH of asthma, back pain and psychiatric disorder presents to the ER for low back pain especially on his right lower side for 2 days  Patient states he was moving heavy boxes when symptoms began  He has been taking Tramadol for pain  He describes his pain as burning and radiating down his right leg  Pain is constant but worse with movement  He denies fever, chills, URI symptoms, chest pain, dyspnea, N/V/D, abdominal pain, urinary symptoms, bowel/bladder incontinence, saddle paresthesias, weakness or paresthesias  Patient states he follows with his pcp for ongoing back problems  He did have an MRI completed "a long time ago" which showed problems with his discs  History provided by:  Patient   used: No        Prior to Admission Medications   Prescriptions Last Dose Informant Patient Reported? Taking?    ALPRAZolam (XANAX) 0 5 mg tablet   Yes No   Sig: TAKE 1 TABLET(0 5 MG) BY MOUTH THREE TIMES DAILY AS NEEDED FOR ANXIETY   Diclofenac Sodium (VOLTAREN) 1 %   No No   Sig: Apply 2 g topically 4 (four) times a day   Influenza Vac Typ A&B Surf Ant SUSP   Yes No   Sig: Fluvirin [id number] 45 mcg (15 mcg x 3)/0 5 mL intramuscular suspension   albuterol (Ventolin HFA) 90 mcg/act inhaler   No No   Sig: Inhale 2 puffs every 6 (six) hours as needed for wheezing or shortness of breath   budesonide-formoterol (Symbicort) 160-4 5 mcg/act inhaler   Yes No   Sig: Inhale 2 puffs 2 (two) times a day   dicyclomine (BENTYL) 20 mg tablet   Yes No   Sig: dicyclomine 20 mg tablet   methocarbamol (ROBAXIN) 500 mg tablet   No No   Sig: Take 1 tablet (500 mg total) by mouth 2 (two) times a day   naproxen (EC NAPROSYN) 500 MG EC tablet   No No   Sig: Take 1 tablet (500 mg total) by mouth 2 (two) times a day with meals for 5 days   traMADol (ULTRAM) 50 mg tablet   Yes No   Sig: Take 50 mg by mouth every 6 (six) hours as needed for moderate pain   traMADol (ULTRAM-ER) 200 MG 24 hr tablet   Yes No   Sig: Take 200 mg by mouth daily      Facility-Administered Medications: None       Past Medical History:   Diagnosis Date    Asthma     Back pain     Psychiatric disorder        Past Surgical History:   Procedure Laterality Date    HAND SURGERY      NO PAST SURGERIES         History reviewed  No pertinent family history  I have reviewed and agree with the history as documented  E-Cigarette/Vaping    E-Cigarette Use Never User      E-Cigarette/Vaping Substances     Social History     Tobacco Use    Smoking status: Current Every Day Smoker     Packs/day: 0 25     Types: Cigarettes    Smokeless tobacco: Never Used   Substance Use Topics    Alcohol use: Yes    Drug use: Yes     Types: Marijuana       Review of Systems   Constitutional: Negative for activity change, appetite change, chills and fever  HENT: Negative for congestion, drooling, ear discharge, ear pain, facial swelling, rhinorrhea and sore throat  Eyes: Negative for photophobia and visual disturbance  Respiratory: Negative for cough and shortness of breath  Cardiovascular: Negative for chest pain  Gastrointestinal: Negative for abdominal pain, diarrhea, nausea and vomiting  Genitourinary: Negative for dysuria, frequency, hematuria and urgency  Musculoskeletal: Positive for back pain  Negative for neck pain and neck stiffness  Skin: Negative for rash  Allergic/Immunologic: Negative for food allergies  Neurological: Negative for weakness, numbness and headaches  Physical Exam  Physical Exam  Vitals signs and nursing note reviewed  Constitutional:       General: He is not in acute distress  Appearance: He is not toxic-appearing  HENT:      Head: Normocephalic and atraumatic        Right Ear: Tympanic membrane, ear canal and external ear normal  No drainage, swelling or tenderness  No foreign body  No hemotympanum  Tympanic membrane is not erythematous  Left Ear: Tympanic membrane, ear canal and external ear normal  No drainage, swelling or tenderness  No foreign body  No hemotympanum  Tympanic membrane is not erythematous  Nose: Nose normal       Mouth/Throat:      Pharynx: Uvula midline  No posterior oropharyngeal erythema or uvula swelling  Tonsils: No tonsillar exudate or tonsillar abscesses  Eyes:      Extraocular Movements: Extraocular movements intact  Conjunctiva/sclera: Conjunctivae normal       Pupils: Pupils are equal, round, and reactive to light  Neck:      Musculoskeletal: Normal range of motion and neck supple  Trachea: Phonation normal  No tracheal deviation  Cardiovascular:      Rate and Rhythm: Normal rate and regular rhythm  Heart sounds: Normal heart sounds, S1 normal and S2 normal  No murmur  No friction rub  No gallop  Pulmonary:      Effort: Pulmonary effort is normal  No respiratory distress  Breath sounds: Normal breath sounds  No decreased breath sounds, wheezing, rhonchi or rales  Chest:      Chest wall: No tenderness  Abdominal:      General: Bowel sounds are normal  There is no distension  Palpations: Abdomen is soft  Tenderness: There is no abdominal tenderness  There is no guarding or rebound  Musculoskeletal: Normal range of motion  General: No deformity  Cervical back: Normal       Thoracic back: Normal       Lumbar back: He exhibits tenderness, bony tenderness and pain  He exhibits normal range of motion, no swelling, no edema and no deformity  Skin:     General: Skin is warm and dry  Findings: No rash  Neurological:      Mental Status: He is alert  GCS: GCS eye subscore is 4  GCS verbal subscore is 5  GCS motor subscore is 6  Cranial Nerves: No cranial nerve deficit        Sensory: No sensory deficit  Motor: No abnormal muscle tone  Gait: Gait normal          Vital Signs  ED Triage Vitals   Temperature Pulse Respirations Blood Pressure SpO2   05/11/21 0831 05/11/21 0831 05/11/21 0831 05/11/21 0831 05/11/21 0831   98 6 °F (37 °C) 65 18 124/79 98 %      Temp Source Heart Rate Source Patient Position - Orthostatic VS BP Location FiO2 (%)   05/11/21 0831 05/11/21 0831 05/11/21 0831 05/11/21 0831 --   Temporal Monitor Sitting Right arm       Pain Score       05/11/21 0851       6           Vitals:    05/11/21 0831   BP: 124/79   Pulse: 65   Patient Position - Orthostatic VS: Sitting         Visual Acuity      ED Medications  Medications   acetaminophen (TYLENOL) tablet 650 mg (650 mg Oral Given 5/11/21 0851)       Diagnostic Studies  Results Reviewed     None                 XR lumbar spine 2 or 3 views   ED Interpretation by Melvin Mello PA-C (05/11 0935)   No acute abnormalities seen by me at this time  by Jonathan Maceky DO (05/11 2816)                 Procedures  Procedures         ED Course                             SBIRT 20yo+      Most Recent Value   SBIRT (23 yo +)   In order to provide better care to our patients, we are screening all of our patients for alcohol and drug use  Would it be okay to ask you these screening questions? Unable to answer at this time Filed at: 05/11/2021 1033                    MDM  Number of Diagnoses or Management Options  Acute right-sided low back pain with right-sided sciatica: new and requires workup  Diagnosis management comments: DDX consists of but not limited to: strain, herniated disc, sciatica     Will xray the lumbar spine  Will give Tylenol for pain  Informed patient I did not see any acute abnormalities on xray at this time and if the radiologist saw anything concerning when reading the xray, we would call to inform them  Patient agreeable      The management plan was discussed in detail with the patient at bedside and all questions were answered  Prior to discharge, we provided both verbal and written instructions  We discussed with the patient the signs and symptoms for which to return to the emergency department  All questions were answered and patient was comfortable with the plan of care and discharged to home  Instructed the patient to follow up with the primary care provider and/or specialist provided and their written instructions  The patient verbalized understanding of our discussion and plan of care, and agrees to return to the Emergency Department for concerns and progression of illness  At discharge, I instructed the patient to:  -follow up with pcp  -take Tylenol for pain  -take Robaxin as prescribed  -rest and apply heat to the area  -follow up with the recommended spine program  -return to the ER if symptoms worsened or new symptoms arose  Patient agreed to this plan and was stable at time of discharge  Amount and/or Complexity of Data Reviewed  Tests in the radiology section of CPT®: ordered and reviewed  Independent visualization of images, tracings, or specimens: yes    Patient Progress  Patient progress: stable      Disposition  Final diagnoses:   Acute right-sided low back pain with right-sided sciatica     Time reflects when diagnosis was documented in both MDM as applicable and the Disposition within this note     Time User Action Codes Description Comment    5/11/2021  9:36 AM Chapito FELICIANO Add [M54 41] Acute right-sided low back pain with right-sided sciatica       ED Disposition     ED Disposition Condition Date/Time Comment    Discharge Stable Tue May 11, 2021  9:36 AM Mercy Oro discharge to home/self care              Follow-up Information     Follow up With Specialties Details Why [address] [facility],, PA-C Physician Assistant Schedule an appointment as soon as possible for a visit    [address]  [facility] [address] [phone]            Patient's Medications Discharge Prescriptions    METHOCARBAMOL (ROBAXIN) 750 MG TABLET    Take 1 tablet (750 mg total) by mouth every 6 (six) hours as needed for muscle spasms       Start Date: 5/11/2021 End Date: --       Order Dose: 750 mg       Quantity: 20 tablet    Refills: 0         PDMP Review     None          ED Provider  Electronically Signed by           Melvina Gerber PA-C  05/11/21 5491

## 2021-05-13 ENCOUNTER — TELEPHONE (OUTPATIENT)
Dept: PHYSICAL THERAPY | Facility: OTHER | Age: 41
End: 2021-05-13

## 2021-05-17 ENCOUNTER — TELEPHONE (OUTPATIENT)
Dept: PHYSICAL THERAPY | Facility: OTHER | Age: 41
End: 2021-05-17

## 2021-05-17 NOTE — TELEPHONE ENCOUNTER
Call placed to the patient per [facility] referral      Voice message left for patient to call back  Phone number and hours of business provided  This the 2nd attempt to reach the patient  Will defer per protocol

## 2021-05-25 ENCOUNTER — TELEPHONE (OUTPATIENT)
Dept: PHYSICAL THERAPY | Facility: OTHER | Age: 41
End: 2021-05-25

## 2021-12-27 ENCOUNTER — HOSPITAL ENCOUNTER (EMERGENCY)
Facility: HOSPITAL | Age: 41
Discharge: HOME | End: 2021-12-27
Attending: EMERGENCY MEDICINE | Admitting: EMERGENCY MEDICINE
Payer: MEDICARE

## 2021-12-27 VITALS
SYSTOLIC BLOOD PRESSURE: 148 MMHG | RESPIRATION RATE: 16 BRPM | HEART RATE: 80 BPM | OXYGEN SATURATION: 99 % | TEMPERATURE: 99 F | DIASTOLIC BLOOD PRESSURE: 94 MMHG

## 2021-12-27 DIAGNOSIS — M79.10 MYALGIA: ICD-10-CM

## 2021-12-27 DIAGNOSIS — R51.9 HEADACHE: ICD-10-CM

## 2021-12-27 DIAGNOSIS — J06.9 VIRAL URI WITH COUGH: Primary | ICD-10-CM

## 2021-12-27 PROCEDURE — 99283 EMERGENCY DEPT VISIT LOW MDM: CPT

## 2021-12-27 PROCEDURE — 96372 THER/PROPH/DIAG INJ SC/IM: CPT

## 2021-12-27 PROCEDURE — 99284 EMERGENCY DEPT VISIT MOD MDM: CPT | Performed by: EMERGENCY MEDICINE

## 2021-12-27 PROCEDURE — 93005 ELECTROCARDIOGRAM TRACING: CPT

## 2021-12-27 RX ORDER — ONDANSETRON 4 MG/1
4 TABLET, ORALLY DISINTEGRATING ORAL ONCE
Status: COMPLETED | OUTPATIENT
Start: 2021-12-27 | End: 2021-12-27

## 2021-12-27 RX ORDER — KETOROLAC TROMETHAMINE 30 MG/ML
15 INJECTION, SOLUTION INTRAMUSCULAR; INTRAVENOUS ONCE
Status: COMPLETED | OUTPATIENT
Start: 2021-12-27 | End: 2021-12-27

## 2021-12-27 RX ADMIN — ONDANSETRON 4 MG: 4 TABLET, ORALLY DISINTEGRATING ORAL at 22:24

## 2021-12-27 RX ADMIN — KETOROLAC TROMETHAMINE 15 MG: 30 INJECTION, SOLUTION INTRAMUSCULAR; INTRAVENOUS at 22:24

## 2021-12-27 NOTE — Clinical Note
Sudha Enrique was seen and treated in our emergency department on 12/27/2021  No work until cleared by Family Doctor/Orthopedics        Diagnosis: Chrystal Shell    He may return on this date: If you have any questions or concerns, please don't hesitate to call        Juju Vasquez, DO    ______________________________           _______________          _______________  Hospital Representative                              Date                                Time

## 2021-12-28 LAB
ATRIAL RATE: 71 BPM
P AXIS: 63 DEGREES
PR INTERVAL: 140 MS
QRS AXIS: 81 DEGREES
QRSD INTERVAL: 90 MS
QT INTERVAL: 354 MS
QTC INTERVAL: 384 MS
T WAVE AXIS: 63 DEGREES
VENTRICULAR RATE: 71 BPM

## 2021-12-28 PROCEDURE — 93010 ELECTROCARDIOGRAM REPORT: CPT

## 2021-12-28 NOTE — DISCHARGE INSTRUCTIONS
Please follow up with your PCP  You may take tylenol and motrin over the counter as needed for fever and headaches/muscle aches  Return to the emergency department for any new or worsening symptoms including difficulty breathing

## 2021-12-28 NOTE — ED ATTENDING ATTESTATION
12/27/2021  IJeramy DO, saw and evaluated the patient  I have discussed the patient with the resident/non-physician practitioner and agree with the resident's/non-physician practitioner's findings, Plan of Care, and MDM as documented in the resident's/non-physician practitioner's note, except where noted  All available labs and Radiology studies were reviewed  I was present for key portions of any procedure(s) performed by the resident/non-physician practitioner and I was immediately available to provide assistance  At this point I agree with the current assessment done in the Emergency Department  I have conducted an independent evaluation of this patient a history and physical is as follows:    Patient is a 80-year-old male that presents with COVID like symptoms for the past 2 days  Patient took 2 home test that were both positive  Patient has a history of asthma and is short of breath at baseline but no worse than his baseline  Patient did receive both vaccines but not the booster yet  On exam the patient appears that he does not feel well but overall is nontoxic  Vital signs are normal   Patient is in no respiratory distress  No focal neurologic deficits  Will not repeat COVID test as we will presume he is COVID positive based on home tests  Will have him quarantine per guidelines and will treat symptomatically  Patient does have a history of gastritis with NSAIDs but I explained he can add Pepcid to his normal regime, use Tylenol and vitamins if needed  Discussed vitamin-D and vitamin-C with the patient  Advised that he continues with his inhalers and to return if his shortness of breath changes from baseline or his oxygen level drops below 90% at home    ED Course         Critical Care Time  Procedures

## 2021-12-28 NOTE — ED PROVIDER NOTES
History  Chief Complaint   Patient presents with    Cough     Patient reports cough, dizzy and CP that began yesterday  Patient is COVID +     Patient is a 56-year-old male with a past medical history of asthma who presents for evaluation of fevers, chills, headaches, cough, and myalgias  Patient states that he has been having symptoms for the past 3 days  Patient is taken to home COVID tests, both of which were positive  Patient did have both COVID vaccines, did not receive his booster  Patient also had COVID infection last year  Patient states that he has been taking DayQuil at home without relief of symptoms  Patient mostly concerned about the headache and cough  States that the cough is productive of yellow sputum  Patient denies any wheezing  Has not had to use his albuterol inhaler more frequently  Patient denies any abdominal pain, but does endorse nausea, no episodes of emesis  History provided by:  Patient   used: Yes (Family member translates for patient)    Cough  Cough characteristics:  Productive  Sputum characteristics:  Yellow  Duration:  3 days  Context comment:  Patient COVID positive  Associated symptoms: chest pain, chills, fever, headaches and myalgias    Associated symptoms: no wheezing        Prior to Admission Medications   Prescriptions Last Dose Informant Patient Reported? Taking?    ALPRAZolam (XANAX) 0 5 mg tablet   Yes No   Sig: TAKE 1 TABLET(0 5 MG) BY MOUTH THREE TIMES DAILY AS NEEDED FOR ANXIETY   Diclofenac Sodium (VOLTAREN) 1 %   No No   Sig: Apply 2 g topically 4 (four) times a day   Influenza Vac Typ A&B Surf Ant SUSP   Yes No   Sig: Fluvirin 7191-3142 45 mcg (15 mcg x 3)/0 5 mL intramuscular suspension   albuterol (Ventolin HFA) 90 mcg/act inhaler   No No   Sig: Inhale 2 puffs every 6 (six) hours as needed for wheezing or shortness of breath   budesonide-formoterol (Symbicort) 160-4 5 mcg/act inhaler   Yes No   Sig: Inhale 2 puffs 2 (two) times a day   dicyclomine (BENTYL) 20 mg tablet   Yes No   Sig: dicyclomine 20 mg tablet   methocarbamol (ROBAXIN) 500 mg tablet   No No   Sig: Take 1 tablet (500 mg total) by mouth 2 (two) times a day   methocarbamol (ROBAXIN) 750 mg tablet   No No   Sig: Take 1 tablet (750 mg total) by mouth every 6 (six) hours as needed for muscle spasms   naproxen (EC NAPROSYN) 500 MG EC tablet   No No   Sig: Take 1 tablet (500 mg total) by mouth 2 (two) times a day with meals for 5 days   traMADol (ULTRAM) 50 mg tablet   Yes No   Sig: Take 50 mg by mouth every 6 (six) hours as needed for moderate pain   traMADol (ULTRAM-ER) 200 MG 24 hr tablet   Yes No   Sig: Take 200 mg by mouth daily      Facility-Administered Medications: None       Past Medical History:   Diagnosis Date    Asthma     Back pain     Psychiatric disorder        Past Surgical History:   Procedure Laterality Date    HAND SURGERY      NO PAST SURGERIES         History reviewed  No pertinent family history  I have reviewed and agree with the history as documented  E-Cigarette/Vaping    E-Cigarette Use Never User      E-Cigarette/Vaping Substances     Social History     Tobacco Use    Smoking status: Current Every Day Smoker     Packs/day: 0 25     Types: Cigarettes    Smokeless tobacco: Never Used   Vaping Use    Vaping Use: Never used   Substance Use Topics    Alcohol use: Yes    Drug use: Yes     Types: Marijuana        Review of Systems   Constitutional: Positive for chills and fever  HENT: Negative  Respiratory: Positive for cough  Negative for wheezing  Cardiovascular: Positive for chest pain  Gastrointestinal: Positive for nausea  Negative for abdominal pain and vomiting  Genitourinary: Negative  Musculoskeletal: Positive for myalgias  Skin: Negative  Neurological: Positive for headaches  All other systems reviewed and are negative        Physical Exam  ED Triage Vitals   Temperature Pulse Respirations Blood Pressure SpO2 12/27/21 1806 12/27/21 1810 12/27/21 1806 12/27/21 1809 12/27/21 1806   99 °F (37 2 °C) 83 20 134/87 100 %      Temp Source Heart Rate Source Patient Position - Orthostatic VS BP Location FiO2 (%)   12/27/21 1806 12/27/21 1806 12/27/21 1806 12/27/21 1806 --   Oral Monitor Sitting Right arm       Pain Score       12/27/21 1806       8             Orthostatic Vital Signs  Vitals:    12/27/21 1806 12/27/21 1809 12/27/21 1810 12/27/21 2224   BP:  134/87  148/94   Pulse:   83 80   Patient Position - Orthostatic VS: Sitting   Lying       Physical Exam  Vitals and nursing note reviewed  Constitutional:       General: He is awake  He is not in acute distress  Appearance: He is ill-appearing  HENT:      Head: Normocephalic and atraumatic  Eyes:      General: Vision grossly intact  Gaze aligned appropriately  Cardiovascular:      Rate and Rhythm: Normal rate and regular rhythm  Heart sounds: Normal heart sounds  Pulmonary:      Effort: Pulmonary effort is normal  No respiratory distress  Breath sounds: Normal breath sounds  No wheezing, rhonchi or rales  Musculoskeletal:      Cervical back: Full passive range of motion without pain and neck supple  Skin:     General: Skin is warm and dry  Neurological:      General: No focal deficit present  Mental Status: He is alert and oriented to person, place, and time  ED Medications  Medications   ketorolac (TORADOL) injection 15 mg (15 mg Intramuscular Given 12/27/21 2224)   ondansetron (ZOFRAN-ODT) dispersible tablet 4 mg (4 mg Oral Given 12/27/21 2224)       Diagnostic Studies  Results Reviewed     None                 No orders to display         Procedures  Procedures      ED Course                             SBIRT 20yo+      Most Recent Value   SBIRT (23 yo +)    In order to provide better care to our patients, we are screening all of our patients for alcohol and drug use  Would it be okay to ask you these screening questions?  No Filed at: 12/27/2021 2206                [facility]  Number of Diagnoses or Management Options  Headache: new and does not require workup  Myalgia: new and does not require workup  Viral URI with cough: new and does not require workup  Diagnosis management comments: 28-year-old male recently tested positive on a home COVID test presents with fevers, chills, myalgias, headache, cough, and chest discomfort  On exam, patient appears uncomfortable but in no acute distress  Patient currently afebrile, normal vital signs  O2 saturation 100 percent on room air  Patient's lungs clear to auscultation bilaterally, no wheezing, rhonchi, or rales noted  Will treat patient's symptomatically with Toradol and Zofran  Patient was given symptomatic care instructions  Patient told to follow-up with PCP regarding return to work  Patient discharged in stable condition with strict ED return precautions  Patient Progress  Patient progress: stable      Disposition  Final diagnoses:   Viral URI with cough   Headache   Myalgia     Time reflects when diagnosis was documented in both MDM as applicable and the Disposition within this note     Time User Action Codes Description Comment    12/27/2021 11:18 PM Manpreet Brandt Add [J06 9] Viral URI with cough     12/27/2021 11:18 PM Manpreet Brandt Add [R51 9] Headache     12/27/2021 11:18 PM Manpreet Brandt Add [M79 10] Myalgia       ED Disposition     ED Disposition Condition Date/Time Comment    Discharge Stable Mon Dec 27, 2021 11:18 PM Pablo Garcias discharge to home/self care              Follow-up Information     Follow up With Specialties Details Why Contact Info Additional [address], [place] Physician Assistant Schedule an appointment as soon as possible for a visit in 1 week   Kacy Plasencia  [facility] [phone] [address] [facility] Emergency Department Emergency Medicine Go to  If symptoms worsen [address] [facility] [address]  [address] [facility] Emergency Department, [address]  , [place], [place], [address]          Discharge Medication List as of 12/27/2021 11:26 PM      CONTINUE these medications which have NOT CHANGED    Details   albuterol (Ventolin HFA) 90 mcg/act inhaler Inhale 2 puffs every 6 (six) hours as needed for wheezing or shortness of breath, Starting u 10/1/2020, Normal      ALPRAZolam (XANAX) 0 5 mg tablet TAKE 1 TABLET(0 5 MG) BY MOUTH THREE TIMES DAILY AS NEEDED FOR ANXIETY, Historical Med      budesonide-formoterol (Symbicort) 160-4 5 mcg/act inhaler Inhale 2 puffs 2 (two) times a day, Starting Tue 1/12/2016, Historical Med      Diclofenac Sodium (VOLTAREN) 1 % Apply 2 g topically 4 (four) times a day, Starting Sun 2/7/2021, Print      dicyclomine (BENTYL) 20 mg tablet dicyclomine 20 mg tablet, Historical Med      Influenza Vac Typ A&B Surf Ant SUSP Fluvirin 8858-6875 45 mcg (15 mcg x 3)/0 5 mL intramuscular suspension, Historical Med      !! methocarbamol (ROBAXIN) 500 mg tablet Take 1 tablet (500 mg total) by mouth 2 (two) times a day, Starting Sun 2/7/2021, Print      !! methocarbamol (ROBAXIN) 750 mg tablet Take 1 tablet (750 mg total) by mouth every 6 (six) hours as needed for muscle spasms, Starting Tue 5/11/2021, Normal      naproxen (EC NAPROSYN) 500 MG EC tablet Take 1 tablet (500 mg total) by mouth 2 (two) times a day with meals for 5 days, Starting Tue 6/16/2020, Until Sun 6/21/2020, Print      traMADol (ULTRAM) 50 mg tablet Take 50 mg by mouth every 6 (six) hours as needed for moderate pain, Historical Med      traMADol (ULTRAM-ER) 200 MG 24 hr tablet Take 200 mg by mouth daily, Historical Med       !! - Potential duplicate medications found  Please discuss with provider  No discharge procedures on file  PDMP Review     None           ED Provider  Attending physically available and evaluated Parisa Durand   I managed the patient along with the ED Attending      Electronically Signed by         Roscoe Mcgraw DO  12/28/21 0125

## 2023-09-23 ENCOUNTER — APPOINTMENT (EMERGENCY)
Dept: CT IMAGING | Facility: HOSPITAL | Age: 43
End: 2023-09-23
Payer: COMMERCIAL

## 2023-09-23 ENCOUNTER — HOSPITAL ENCOUNTER (INPATIENT)
Facility: HOSPITAL | Age: 43
LOS: 2 days | Discharge: HOME | End: 2023-09-25
Attending: STUDENT IN AN ORGANIZED HEALTH CARE EDUCATION/TRAINING PROGRAM | Admitting: STUDENT IN AN ORGANIZED HEALTH CARE EDUCATION/TRAINING PROGRAM
Payer: COMMERCIAL

## 2023-09-23 DIAGNOSIS — L03.211 FACIAL CELLULITIS: Primary | ICD-10-CM

## 2023-09-23 LAB
ANION GAP SERPL CALCULATED.3IONS-SCNC: 5 MMOL/L
BASOPHILS # BLD AUTO: 0.05 THOUSANDS/ÂΜL (ref 0–0.1)
BASOPHILS NFR BLD AUTO: 0 % (ref 0–1)
BUN SERPL-MCNC: 8 MG/DL (ref 5–25)
CALCIUM SERPL-MCNC: 9.3 MG/DL (ref 8.4–10.2)
CHLORIDE SERPL-SCNC: 102 MMOL/L (ref 96–108)
CO2 SERPL-SCNC: 33 MMOL/L (ref 21–32)
CREAT SERPL-MCNC: 1.21 MG/DL (ref 0.6–1.3)
EOSINOPHIL # BLD AUTO: 0.21 THOUSAND/ÂΜL (ref 0–0.61)
EOSINOPHIL NFR BLD AUTO: 2 % (ref 0–6)
ERYTHROCYTE [DISTWIDTH] IN BLOOD BY AUTOMATED COUNT: 13.2 % (ref 11.6–15.1)
GFR SERPL CREATININE-BSD FRML MDRD: 73 ML/MIN/1.73SQ M
GLUCOSE SERPL-MCNC: 93 MG/DL (ref 65–140)
HCT VFR BLD AUTO: 41.8 % (ref 36.5–49.3)
HGB BLD-MCNC: 13.1 G/DL (ref 12–17)
IMM GRANULOCYTES # BLD AUTO: 0.05 THOUSAND/UL (ref 0–0.2)
IMM GRANULOCYTES NFR BLD AUTO: 0 % (ref 0–2)
LYMPHOCYTES # BLD AUTO: 3.45 THOUSANDS/ÂΜL (ref 0.6–4.47)
LYMPHOCYTES NFR BLD AUTO: 26 % (ref 14–44)
MCH RBC QN AUTO: 28.9 PG (ref 26.8–34.3)
MCHC RBC AUTO-ENTMCNC: 31.3 G/DL (ref 31.4–37.4)
MCV RBC AUTO: 92 FL (ref 82–98)
MONOCYTES # BLD AUTO: 0.89 THOUSAND/ÂΜL (ref 0.17–1.22)
MONOCYTES NFR BLD AUTO: 7 % (ref 4–12)
NEUTROPHILS # BLD AUTO: 8.84 THOUSANDS/ÂΜL (ref 1.85–7.62)
NEUTS SEG NFR BLD AUTO: 65 % (ref 43–75)
NRBC BLD AUTO-RTO: 0 /100 WBCS
PLATELET # BLD AUTO: 240 THOUSANDS/UL (ref 149–390)
PMV BLD AUTO: 9.9 FL (ref 8.9–12.7)
POTASSIUM SERPL-SCNC: 4.5 MMOL/L (ref 3.5–5.3)
PROCALCITONIN SERPL-MCNC: 0.05 NG/ML
RBC # BLD AUTO: 4.54 MILLION/UL (ref 3.88–5.62)
SODIUM SERPL-SCNC: 140 MMOL/L (ref 135–147)
WBC # BLD AUTO: 13.49 THOUSAND/UL (ref 4.31–10.16)

## 2023-09-23 PROCEDURE — 84145 PROCALCITONIN (PCT): CPT

## 2023-09-23 PROCEDURE — 70487 CT MAXILLOFACIAL W/DYE: CPT

## 2023-09-23 PROCEDURE — 87040 BLOOD CULTURE FOR BACTERIA: CPT | Performed by: EMERGENCY MEDICINE

## 2023-09-23 PROCEDURE — 99283 EMERGENCY DEPT VISIT LOW MDM: CPT

## 2023-09-23 PROCEDURE — 36415 COLL VENOUS BLD VENIPUNCTURE: CPT | Performed by: STUDENT IN AN ORGANIZED HEALTH CARE EDUCATION/TRAINING PROGRAM

## 2023-09-23 PROCEDURE — 85025 COMPLETE CBC W/AUTO DIFF WBC: CPT | Performed by: STUDENT IN AN ORGANIZED HEALTH CARE EDUCATION/TRAINING PROGRAM

## 2023-09-23 PROCEDURE — 99285 EMERGENCY DEPT VISIT HI MDM: CPT | Performed by: STUDENT IN AN ORGANIZED HEALTH CARE EDUCATION/TRAINING PROGRAM

## 2023-09-23 PROCEDURE — 80048 BASIC METABOLIC PNL TOTAL CA: CPT | Performed by: STUDENT IN AN ORGANIZED HEALTH CARE EDUCATION/TRAINING PROGRAM

## 2023-09-23 PROCEDURE — G1004 CDSM NDSC: HCPCS

## 2023-09-23 RX ORDER — SODIUM CHLORIDE, SODIUM GLUCONATE, SODIUM ACETATE, POTASSIUM CHLORIDE, MAGNESIUM CHLORIDE, SODIUM PHOSPHATE, DIBASIC, AND POTASSIUM PHOSPHATE .53; .5; .37; .037; .03; .012; .00082 G/100ML; G/100ML; G/100ML; G/100ML; G/100ML; G/100ML; G/100ML
1000 INJECTION, SOLUTION INTRAVENOUS ONCE
Status: COMPLETED | OUTPATIENT
Start: 2023-09-23 | End: 2023-09-24

## 2023-09-23 RX ORDER — CLINDAMYCIN PHOSPHATE 600 MG/50ML
600 INJECTION INTRAVENOUS ONCE
Status: DISCONTINUED | OUTPATIENT
Start: 2023-09-23 | End: 2023-09-23

## 2023-09-23 RX ORDER — DEXAMETHASONE SODIUM PHOSPHATE 10 MG/ML
10 INJECTION, SOLUTION INTRAMUSCULAR; INTRAVENOUS ONCE
Status: COMPLETED | OUTPATIENT
Start: 2023-09-23 | End: 2023-09-23

## 2023-09-23 RX ADMIN — SODIUM CHLORIDE 3 G: 9 INJECTION, SOLUTION INTRAVENOUS at 23:42

## 2023-09-23 RX ADMIN — SODIUM CHLORIDE, SODIUM GLUCONATE, SODIUM ACETATE, POTASSIUM CHLORIDE, MAGNESIUM CHLORIDE, SODIUM PHOSPHATE, DIBASIC, AND POTASSIUM PHOSPHATE 1000 ML: .53; .5; .37; .037; .03; .012; .00082 INJECTION, SOLUTION INTRAVENOUS at 23:40

## 2023-09-23 RX ADMIN — IOHEXOL 100 ML: 350 INJECTION, SOLUTION INTRAVENOUS at 21:32

## 2023-09-23 RX ADMIN — DEXAMETHASONE SODIUM PHOSPHATE 10 MG: 10 INJECTION INTRAMUSCULAR; INTRAVENOUS at 23:27

## 2023-09-24 PROBLEM — A41.9 SEPSIS (HCC): Status: ACTIVE | Noted: 2023-09-24

## 2023-09-24 PROBLEM — L03.211 FACIAL CELLULITIS: Status: ACTIVE | Noted: 2023-09-24

## 2023-09-24 LAB
ANION GAP SERPL CALCULATED.3IONS-SCNC: 9 MMOL/L
ATRIAL RATE: 82 BPM
ATRIAL RATE: 92 BPM
BASOPHILS # BLD AUTO: 0.03 THOUSANDS/ÂΜL (ref 0–0.1)
BASOPHILS NFR BLD AUTO: 0 % (ref 0–1)
BUN SERPL-MCNC: 9 MG/DL (ref 5–25)
CALCIUM SERPL-MCNC: 9 MG/DL (ref 8.4–10.2)
CHLORIDE SERPL-SCNC: 103 MMOL/L (ref 96–108)
CO2 SERPL-SCNC: 24 MMOL/L (ref 21–32)
CREAT SERPL-MCNC: 0.9 MG/DL (ref 0.6–1.3)
EOSINOPHIL # BLD AUTO: 0.01 THOUSAND/ÂΜL (ref 0–0.61)
EOSINOPHIL NFR BLD AUTO: 0 % (ref 0–6)
ERYTHROCYTE [DISTWIDTH] IN BLOOD BY AUTOMATED COUNT: 13.2 % (ref 11.6–15.1)
GFR SERPL CREATININE-BSD FRML MDRD: 105 ML/MIN/1.73SQ M
GLUCOSE SERPL-MCNC: 219 MG/DL (ref 65–140)
HCT VFR BLD AUTO: 41.5 % (ref 36.5–49.3)
HGB BLD-MCNC: 13.2 G/DL (ref 12–17)
IMM GRANULOCYTES # BLD AUTO: 0.06 THOUSAND/UL (ref 0–0.2)
IMM GRANULOCYTES NFR BLD AUTO: 1 % (ref 0–2)
LACTATE SERPL-SCNC: 0.7 MMOL/L (ref 0.5–2)
LYMPHOCYTES # BLD AUTO: 0.92 THOUSANDS/ÂΜL (ref 0.6–4.47)
LYMPHOCYTES NFR BLD AUTO: 11 % (ref 14–44)
MAGNESIUM SERPL-MCNC: 2.1 MG/DL (ref 1.9–2.7)
MCH RBC QN AUTO: 29 PG (ref 26.8–34.3)
MCHC RBC AUTO-ENTMCNC: 31.8 G/DL (ref 31.4–37.4)
MCV RBC AUTO: 91 FL (ref 82–98)
MONOCYTES # BLD AUTO: 0.09 THOUSAND/ÂΜL (ref 0.17–1.22)
MONOCYTES NFR BLD AUTO: 1 % (ref 4–12)
NEUTROPHILS # BLD AUTO: 7.69 THOUSANDS/ÂΜL (ref 1.85–7.62)
NEUTS SEG NFR BLD AUTO: 87 % (ref 43–75)
NRBC BLD AUTO-RTO: 0 /100 WBCS
P AXIS: 73 DEGREES
P AXIS: 77 DEGREES
PLATELET # BLD AUTO: 245 THOUSANDS/UL (ref 149–390)
PMV BLD AUTO: 10.7 FL (ref 8.9–12.7)
POTASSIUM SERPL-SCNC: 4 MMOL/L (ref 3.5–5.3)
PR INTERVAL: 150 MS
PR INTERVAL: 154 MS
PROCALCITONIN SERPL-MCNC: 0.06 NG/ML
QRS AXIS: 86 DEGREES
QRS AXIS: 87 DEGREES
QRSD INTERVAL: 92 MS
QRSD INTERVAL: 94 MS
QT INTERVAL: 354 MS
QT INTERVAL: 360 MS
QTC INTERVAL: 420 MS
QTC INTERVAL: 437 MS
RBC # BLD AUTO: 4.55 MILLION/UL (ref 3.88–5.62)
SODIUM SERPL-SCNC: 136 MMOL/L (ref 135–147)
T WAVE AXIS: 44 DEGREES
T WAVE AXIS: 61 DEGREES
VENTRICULAR RATE: 82 BPM
VENTRICULAR RATE: 92 BPM
WBC # BLD AUTO: 8.8 THOUSAND/UL (ref 4.31–10.16)

## 2023-09-24 PROCEDURE — 99223 1ST HOSP IP/OBS HIGH 75: CPT | Performed by: HOSPITALIST

## 2023-09-24 PROCEDURE — 36415 COLL VENOUS BLD VENIPUNCTURE: CPT

## 2023-09-24 PROCEDURE — 83605 ASSAY OF LACTIC ACID: CPT

## 2023-09-24 PROCEDURE — 93005 ELECTROCARDIOGRAM TRACING: CPT

## 2023-09-24 PROCEDURE — 84145 PROCALCITONIN (PCT): CPT

## 2023-09-24 PROCEDURE — 80048 BASIC METABOLIC PNL TOTAL CA: CPT

## 2023-09-24 PROCEDURE — 85025 COMPLETE CBC W/AUTO DIFF WBC: CPT

## 2023-09-24 PROCEDURE — 93010 ELECTROCARDIOGRAM REPORT: CPT | Performed by: INTERNAL MEDICINE

## 2023-09-24 PROCEDURE — 83735 ASSAY OF MAGNESIUM: CPT

## 2023-09-24 PROCEDURE — 99221 1ST HOSP IP/OBS SF/LOW 40: CPT | Performed by: OTOLARYNGOLOGY

## 2023-09-24 RX ORDER — ALBUTEROL SULFATE 90 UG/1
2 AEROSOL, METERED RESPIRATORY (INHALATION) EVERY 6 HOURS PRN
Status: DISCONTINUED | OUTPATIENT
Start: 2023-09-24 | End: 2023-09-25 | Stop reason: HOSPADM

## 2023-09-24 RX ORDER — DEXAMETHASONE SODIUM PHOSPHATE 10 MG/ML
10 INJECTION, SOLUTION INTRAMUSCULAR; INTRAVENOUS EVERY 8 HOURS SCHEDULED
Status: COMPLETED | OUTPATIENT
Start: 2023-09-24 | End: 2023-09-24

## 2023-09-24 RX ORDER — MAGNESIUM HYDROXIDE/ALUMINUM HYDROXICE/SIMETHICONE 120; 1200; 1200 MG/30ML; MG/30ML; MG/30ML
30 SUSPENSION ORAL EVERY 6 HOURS PRN
Status: DISCONTINUED | OUTPATIENT
Start: 2023-09-24 | End: 2023-09-25 | Stop reason: HOSPADM

## 2023-09-24 RX ORDER — ONDANSETRON 2 MG/ML
4 INJECTION INTRAMUSCULAR; INTRAVENOUS EVERY 6 HOURS PRN
Status: DISCONTINUED | OUTPATIENT
Start: 2023-09-24 | End: 2023-09-25 | Stop reason: HOSPADM

## 2023-09-24 RX ORDER — SODIUM CHLORIDE, SODIUM GLUCONATE, SODIUM ACETATE, POTASSIUM CHLORIDE, MAGNESIUM CHLORIDE, SODIUM PHOSPHATE, DIBASIC, AND POTASSIUM PHOSPHATE .53; .5; .37; .037; .03; .012; .00082 G/100ML; G/100ML; G/100ML; G/100ML; G/100ML; G/100ML; G/100ML
1000 INJECTION, SOLUTION INTRAVENOUS ONCE
Status: COMPLETED | OUTPATIENT
Start: 2023-09-24 | End: 2023-09-24

## 2023-09-24 RX ORDER — METOCLOPRAMIDE HYDROCHLORIDE 5 MG/ML
10 INJECTION INTRAMUSCULAR; INTRAVENOUS EVERY 8 HOURS SCHEDULED
Status: DISCONTINUED | OUTPATIENT
Start: 2023-09-24 | End: 2023-09-25 | Stop reason: HOSPADM

## 2023-09-24 RX ORDER — METHOCARBAMOL 750 MG/1
750 TABLET, FILM COATED ORAL EVERY 6 HOURS PRN
Status: DISCONTINUED | OUTPATIENT
Start: 2023-09-24 | End: 2023-09-25 | Stop reason: HOSPADM

## 2023-09-24 RX ORDER — MAGNESIUM SULFATE HEPTAHYDRATE 40 MG/ML
2 INJECTION, SOLUTION INTRAVENOUS
Status: DISCONTINUED | OUTPATIENT
Start: 2023-09-24 | End: 2023-09-25 | Stop reason: HOSPADM

## 2023-09-24 RX ORDER — TRAMADOL HYDROCHLORIDE 50 MG/1
50 TABLET ORAL EVERY 6 HOURS PRN
Status: DISCONTINUED | OUTPATIENT
Start: 2023-09-24 | End: 2023-09-25 | Stop reason: HOSPADM

## 2023-09-24 RX ORDER — DIPHENHYDRAMINE HYDROCHLORIDE 50 MG/ML
25 INJECTION INTRAMUSCULAR; INTRAVENOUS EVERY 8 HOURS SCHEDULED
Status: DISCONTINUED | OUTPATIENT
Start: 2023-09-24 | End: 2023-09-25 | Stop reason: HOSPADM

## 2023-09-24 RX ORDER — ENOXAPARIN SODIUM 100 MG/ML
40 INJECTION SUBCUTANEOUS DAILY
Status: DISCONTINUED | OUTPATIENT
Start: 2023-09-24 | End: 2023-09-25 | Stop reason: HOSPADM

## 2023-09-24 RX ORDER — ALPRAZOLAM 0.5 MG/1
0.5 TABLET ORAL 3 TIMES DAILY PRN
Status: DISCONTINUED | OUTPATIENT
Start: 2023-09-24 | End: 2023-09-25 | Stop reason: HOSPADM

## 2023-09-24 RX ORDER — SODIUM CHLORIDE, SODIUM GLUCONATE, SODIUM ACETATE, POTASSIUM CHLORIDE, MAGNESIUM CHLORIDE, SODIUM PHOSPHATE, DIBASIC, AND POTASSIUM PHOSPHATE .53; .5; .37; .037; .03; .012; .00082 G/100ML; G/100ML; G/100ML; G/100ML; G/100ML; G/100ML; G/100ML
125 INJECTION, SOLUTION INTRAVENOUS CONTINUOUS
Status: DISCONTINUED | OUTPATIENT
Start: 2023-09-24 | End: 2023-09-24

## 2023-09-24 RX ORDER — VANCOMYCIN HYDROCHLORIDE 1 G/200ML
1000 INJECTION, SOLUTION INTRAVENOUS EVERY 12 HOURS
Status: DISCONTINUED | OUTPATIENT
Start: 2023-09-24 | End: 2023-09-25

## 2023-09-24 RX ORDER — BUDESONIDE AND FORMOTEROL FUMARATE DIHYDRATE 160; 4.5 UG/1; UG/1
2 AEROSOL RESPIRATORY (INHALATION) 2 TIMES DAILY
Status: DISCONTINUED | OUTPATIENT
Start: 2023-09-24 | End: 2023-09-25 | Stop reason: HOSPADM

## 2023-09-24 RX ADMIN — SODIUM CHLORIDE 3 G: 9 INJECTION, SOLUTION INTRAVENOUS at 06:25

## 2023-09-24 RX ADMIN — VANCOMYCIN HYDROCHLORIDE 1000 MG: 1 INJECTION, SOLUTION INTRAVENOUS at 19:46

## 2023-09-24 RX ADMIN — SODIUM CHLORIDE, SODIUM GLUCONATE, SODIUM ACETATE, POTASSIUM CHLORIDE, MAGNESIUM CHLORIDE, SODIUM PHOSPHATE, DIBASIC, AND POTASSIUM PHOSPHATE 125 ML/HR: .53; .5; .37; .037; .03; .012; .00082 INJECTION, SOLUTION INTRAVENOUS at 01:51

## 2023-09-24 RX ADMIN — VANCOMYCIN HYDROCHLORIDE 750 MG: 750 INJECTION, SOLUTION INTRAVENOUS at 07:39

## 2023-09-24 RX ADMIN — ALBUTEROL SULFATE 2 PUFF: 90 AEROSOL, METERED RESPIRATORY (INHALATION) at 01:42

## 2023-09-24 RX ADMIN — DIPHENHYDRAMINE HYDROCHLORIDE 25 MG: 50 INJECTION, SOLUTION INTRAMUSCULAR; INTRAVENOUS at 02:18

## 2023-09-24 RX ADMIN — BUDESONIDE AND FORMOTEROL FUMARATE DIHYDRATE 2 PUFF: 160; 4.5 AEROSOL RESPIRATORY (INHALATION) at 07:43

## 2023-09-24 RX ADMIN — DEXAMETHASONE SODIUM PHOSPHATE 10 MG: 10 INJECTION INTRAMUSCULAR; INTRAVENOUS at 07:41

## 2023-09-24 RX ADMIN — METOCLOPRAMIDE 10 MG: 5 INJECTION, SOLUTION INTRAMUSCULAR; INTRAVENOUS at 17:36

## 2023-09-24 RX ADMIN — METOCLOPRAMIDE 10 MG: 5 INJECTION, SOLUTION INTRAMUSCULAR; INTRAVENOUS at 09:18

## 2023-09-24 RX ADMIN — DIPHENHYDRAMINE HYDROCHLORIDE 25 MG: 50 INJECTION, SOLUTION INTRAMUSCULAR; INTRAVENOUS at 09:19

## 2023-09-24 RX ADMIN — DIPHENHYDRAMINE HYDROCHLORIDE 25 MG: 50 INJECTION, SOLUTION INTRAMUSCULAR; INTRAVENOUS at 17:36

## 2023-09-24 RX ADMIN — SODIUM CHLORIDE 3 G: 9 INJECTION, SOLUTION INTRAVENOUS at 23:31

## 2023-09-24 RX ADMIN — MUPIROCIN: 20 OINTMENT TOPICAL at 17:36

## 2023-09-24 RX ADMIN — MAGNESIUM SULFATE HEPTAHYDRATE 2 G: 40 INJECTION, SOLUTION INTRAVENOUS at 07:40

## 2023-09-24 RX ADMIN — BUDESONIDE AND FORMOTEROL FUMARATE DIHYDRATE 2 PUFF: 160; 4.5 AEROSOL RESPIRATORY (INHALATION) at 17:36

## 2023-09-24 RX ADMIN — VANCOMYCIN HYDROCHLORIDE 1250 MG: 10 INJECTION, POWDER, LYOPHILIZED, FOR SOLUTION INTRAVENOUS at 01:23

## 2023-09-24 RX ADMIN — DEXAMETHASONE SODIUM PHOSPHATE 10 MG: 10 INJECTION INTRAMUSCULAR; INTRAVENOUS at 15:00

## 2023-09-24 RX ADMIN — TRAMADOL HYDROCHLORIDE 50 MG: 50 TABLET, COATED ORAL at 19:46

## 2023-09-24 RX ADMIN — SODIUM CHLORIDE 3 G: 9 INJECTION, SOLUTION INTRAVENOUS at 11:39

## 2023-09-24 RX ADMIN — TRAMADOL HYDROCHLORIDE 50 MG: 50 TABLET, COATED ORAL at 06:53

## 2023-09-24 RX ADMIN — SODIUM CHLORIDE 3 G: 9 INJECTION, SOLUTION INTRAVENOUS at 17:37

## 2023-09-24 RX ADMIN — SODIUM CHLORIDE, SODIUM GLUCONATE, SODIUM ACETATE, POTASSIUM CHLORIDE, MAGNESIUM CHLORIDE, SODIUM PHOSPHATE, DIBASIC, AND POTASSIUM PHOSPHATE 1000 ML: .53; .5; .37; .037; .03; .012; .00082 INJECTION, SOLUTION INTRAVENOUS at 01:22

## 2023-09-24 RX ADMIN — METOCLOPRAMIDE 10 MG: 5 INJECTION, SOLUTION INTRAMUSCULAR; INTRAVENOUS at 02:18

## 2023-09-24 NOTE — PROGRESS NOTES
Reg Mcmullen is a 41 y.o. male who is currently ordered Vancomycin IV with management by the Pharmacy Consult service. Relevant clinical data and objective / subjective history reviewed. Vancomycin Assessment:  Indication and Goal AUC/Trough: Soft tissue (goal -600, trough >10)  Clinical Status: stable  Micro:   pending  Renal Function:  SCr: 1.21 mg/dL  CrCl: 70.8 mL/min  Renal replacement: Not on dialysis  Days of Therapy: 1  Current Dose:    Vancomycin Plan:  New Dosinmg iv 12h   Estimated AUC: 433 mcg*hr/mL  Estimated Trough: 14.1 mcg/mL  Next Level:  random with am labs  Renal Function Monitoring: Daily BMP and [place] will continue to follow closely for s/sx of nephrotoxicity, infusion reactions and appropriateness of therapy. BMP and CBC will be ordered per protocol. We will continue to follow the patient’s culture results and clinical progress daily.     Nicole Medrano, Pharmacist

## 2023-09-24 NOTE — ED CARE HANDOFF
Emergency Department Sign Out Note        Sign out and transfer of care from Dr. Jv Velasco. See Separate Emergency Department note. The patient, Jessie Allison, was evaluated by the previous provider for facial swelling and erythema. Workup Completed:  Labs, CT.    ED Course / Workup Pending (followup):                                  ED Course as of 09/24/23 0139   Sat Sep 23, 2023   2155 Patient received in sign out, c/o pimple nose, right sided facial swelling and erythema. Facial CT pending, if non-acute, expect d/c on Abx.   2250 CT facial bones with contrast  IMPRESSION:     Small rim-enhancing collection in the right paranasal soft tissues (axial image 44, series 3) measures approximately 1.1 cm in size, suspicious for small abscess. There are surrounding inflammatory changes and fat stranding in this region which extends   along the anterior right premaxillary region and the right inferior periorbital region suggesting associated premaxillary and preseptal cellulitis.     No discrete evidence of post septal cellulitis.     Shotty right submandibular and cervical chain lymph nodes, possibly reactive. 2255 Discussed with Dr. Yahir Brooks ENT, advised admission, Abx, Decadron. Procedures  MDM        Disposition  Final diagnoses:   Facial cellulitis     Time reflects when diagnosis was documented in both MDM as applicable and the Disposition within this note     Time User Action Codes Description Comment    9/23/2023 11:17 PM Melonie Kaur [X98.796] Facial cellulitis       ED Disposition     ED Disposition   Admit    Condition   Stable    Date/Time   Sat Sep 23, 2023 11:18 PM    Comment   Case was discussed with Fareed Tyler and the patient's admission status was agreed to be Admission Status: inpatient status to the service of Dr. Marcin Alvarado.            Follow-up Information    None       Current Discharge Medication List      CONTINUE these medications which have NOT CHANGED    Details   albuterol (Ventolin HFA) 90 mcg/act inhaler Inhale 2 puffs every 6 (six) hours as needed for wheezing or shortness of breath  Qty: 18 g, Refills: 0    Comments: Substitution to a formulary equivalent within the same pharmaceutical class is authorized. Associated Diagnoses: Close exposure to COVID-19 virus      ALPRAZolam (XANAX) 0.5 mg tablet TAKE 1 TABLET(0.5 MG) BY MOUTH THREE TIMES DAILY AS NEEDED FOR ANXIETY      budesonide-formoterol (Symbicort) 160-4.5 mcg/act inhaler Inhale 2 puffs 2 (two) times a day      Diclofenac Sodium (VOLTAREN) 1 % Apply 2 g topically 4 (four) times a day  Qty: 100 g, Refills: 0    Associated Diagnoses: Musculoskeletal back pain      dicyclomine (BENTYL) 20 mg tablet dicyclomine 20 mg tablet      Influenza Vac Typ A&B Surf Ant SUSP Fluvirin 1508-1635 45 mcg (15 mcg x 3)/0.5 mL intramuscular suspension      !! methocarbamol (ROBAXIN) 500 mg tablet Take 1 tablet (500 mg total) by mouth 2 (two) times a day  Qty: 20 tablet, Refills: 0    Associated Diagnoses: Musculoskeletal back pain      !! methocarbamol (ROBAXIN) 750 mg tablet Take 1 tablet (750 mg total) by mouth every 6 (six) hours as needed for muscle spasms  Qty: 20 tablet, Refills: 0    Associated Diagnoses: Acute right-sided low back pain with right-sided sciatica      naproxen (EC NAPROSYN) 500 MG EC tablet Take 1 tablet (500 mg total) by mouth 2 (two) times a day with meals for 5 days  Qty: 10 tablet, Refills: 0    Associated Diagnoses: Laceration of left thumb      traMADol (ULTRAM) 50 mg tablet Take 50 mg by mouth every 6 (six) hours as needed for moderate pain      traMADol (ULTRAM-ER) 200 MG 24 hr tablet Take 200 mg by mouth daily       ! ! - Potential duplicate medications found. Please discuss with provider. No discharge procedures on file.        ED Provider  Electronically Signed by     Justyna Morel MD  09/24/23 7914

## 2023-09-24 NOTE — H&P
233 [facility]  H&P  Name: Earmon Riedel 41 y.o. male I MRN: [id number]  Unit/Bed#: [facility] -[facility] I Date of Admission: 9/23/2023   Date of Service: 9/24/2023 I Hospital Day: 1      Assessment/Plan   * Sepsis secondary to facial cellulitis [facility])  Assessment & Plan  · Patient with PMHx of asthma, chronic back pain, and anxiety presented to the ED with complaint of increased swelling and pain of right eye and maxillary region x 2 days. · Pt reports that he was stung by a bee earlier in the month leading to an itchy welt that has not healed secondary to scratching. He states the area became swollen, erythematous, and warm 2 days ago worsening since, now with right eye swelling and blurred vision at times. +myalgias, chills, 9/10 headache with sinus pressure  o Patient presented to the ED fulfilling septic criteria with tachycardia and leukocytosis of 13.49  · CT facial bones demonstrating "small rim-enhancing collection in the right paranasal soft tissues (axial image 44, series 3) measures approximately 1.1 cm in size, suspicious for small abscess. There are surrounding inflammatory changes and fat stranding in this region which extends along the anterior right premaxillary region and the right inferior periorbital region suggesting associated premaxillary and preseptal cellulitis. No discrete evidence of post septal cellulitis.  Shotty right submandibular and cervical chain lymph nodes, possibly reactive."   • Lactic acid 0.7  • Procalc 0.05, repeat in AM  • Blood cultures pending   • Continue supportive care, pain control  • Will give migraine cocktail for severe headache  • S/p 2L fluid bolus in the ED, continue fluids  • ED contacted oncall ENT, recommendations appreciated  o 10mg decadron q8 x 3 doses  o Unasyn and vancomycin  o Consulted      Anxiety  Assessment & Plan  · Continue home regimen of xanax prn  · PDMP reviewed    Chronic low back pain  Assessment & Plan  · Pt reports chronic issue for years  · Continue tramadol, PDMP reviewed    Moderate persistent asthma without complication  Assessment & Plan  · No acute exacerbation  · Continue home inhaler regimen       VTE Pharmacologic Prophylaxis: VTE Score: 4 Moderate Risk (Score 3-4) - Pharmacological DVT Prophylaxis Ordered: enoxaparin (Lovenox). Code Status: Level 1 - Full Code   Discussion with family: Patient declined call to . Anticipated Length of Stay: Patient will be admitted on an inpatient basis with an anticipated length of stay of greater than 2 midnights secondary to facial cellulitis. Total Time Spent on Date of Encounter in care of patient: 75 minutes This time was spent on one or more of the following: performing physical exam; counseling and coordination of care; obtaining or reviewing history; documenting in the medical record; reviewing/ordering tests, medications or procedures; communicating with other healthcare professionals and discussing with patient's family/caregivers. Chief Complaint: "My face and eye starting swelling x 2 days ago, and it's getting worse"    History of Present Illness:  Annette Escobar is a 41 y.o. male who presents with premaxillary and preseptal cellulitis. Patient reports that at the beginning of this month he was stung by several bees one of them on the right side of his nose. He reports that an itchy welt formed after that which has persisted secondary to scratching. He reports that beginning 2 days ago he noted that the area became inflamed with swelling and surrounding erythema. He reports that it has continued to get worse to the point where the swelling and erythema have traveled to his right eye. Patient also endorses intermittent blurry vision, pain with movement of eyes, chills, myalgias, sinus pressure, and 9/10 headache.   Patient denies any other symptoms such as chest pain, palpitations, shortness of breath, congestion, abdominal pain, nausea, vomiting, or pedal edema. Patient also endorses some back pain from lying in the bed however he reports he has chronic low back pain issues. Review of Systems:  Review of Systems   Constitutional: Positive for chills and fatigue. Negative for appetite change, diaphoresis and fever. HENT: Positive for facial swelling, sinus pressure and sinus pain. Negative for congestion, rhinorrhea and sore throat. Eyes: Positive for pain (with movement) and visual disturbance (intermittent blurriness). Negative for photophobia. Respiratory: Negative for cough, shortness of breath and wheezing. Cardiovascular: Negative for chest pain, palpitations and leg swelling. Gastrointestinal: Negative for abdominal distention, abdominal pain, blood in stool, constipation, diarrhea, nausea and vomiting. Genitourinary: Negative for dysuria and hematuria. Musculoskeletal: Positive for myalgias. Negative for arthralgias, back pain and neck stiffness. Skin: Negative for color change and rash. Neurological: Positive for headaches. Negative for dizziness, seizures, syncope, weakness and light-headedness. Psychiatric/Behavioral: Negative for agitation, behavioral problems and confusion. The patient is nervous/anxious. All other systems reviewed and are negative. Past Medical and Surgical History:   Past Medical History:   Diagnosis Date   • Asthma    • Back pain    • Psychiatric disorder        Past Surgical History:   Procedure Laterality Date   • HAND SURGERY     • NO PAST SURGERIES         Meds/Allergies:  Prior to Admission medications    Medication Sig Start Date End Date Taking?  Authorizing Provider   albuterol (Ventolin HFA) 90 mcg/act inhaler Inhale 2 puffs every 6 (six) hours as needed for wheezing or shortness of breath 10/1/20   Jordan Black PA-C   ALPRAZolam (XANAX) 0.5 mg tablet TAKE 1 TABLET(0.5 MG) BY MOUTH THREE TIMES DAILY AS NEEDED FOR ANXIETY 8/31/20   Historical Provider, MD budesonide-formoterol (Symbicort) 160-4.5 mcg/act inhaler Inhale 2 puffs 2 (two) times a day 1/12/16   Historical Provider, MD   Diclofenac Sodium (VOLTAREN) 1 % Apply 2 g topically 4 (four) times a day 2/7/21   Kaz Poag, DO   dicyclomine (BENTYL) 20 mg tablet dicyclomine 20 mg tablet    Historical Provider, MD   Influenza Vac Typ A&B Surf Ant SUSP Fluvirin 4411-5775 45 mcg (15 mcg x 3)/0.5 mL intramuscular suspension    Historical Provider, MD   methocarbamol (ROBAXIN) 500 mg tablet Take 1 tablet (500 mg total) by mouth 2 (two) times a day 2/7/21   Kaz Poag, DO   methocarbamol (ROBAXIN) 750 mg tablet Take 1 tablet (750 mg total) by mouth every 6 (six) hours as needed for muscle spasms 5/11/21   [address] [facility]   naproxen (EC NAPROSYN) 500 MG EC tablet Take 1 tablet (500 mg total) by mouth 2 (two) times a day with meals for 5 days 6/16/20 6/21/20  Enrrique Lopes PA-C   traMADol (ULTRAM) 50 mg tablet Take 50 mg by mouth every 6 (six) hours as needed for moderate pain    Historical Provider, MD   traMADol (ULTRAM-ER) 200 MG 24 hr tablet Take 200 mg by mouth daily    Historical Provider, MD     I have reviewed home medications with patient personally. Allergies:    Allergies   Allergen Reactions   • Nsaids      Gastritis    • Oxycodone-Acetaminophen    • Prednisone Hives     "puts me in bad mood"   • Percocet [Oxycodone-Acetaminophen] Rash   • Vicodin [Hydrocodone-Acetaminophen] Rash       Social History:  Marital Status: /Civil Union   Patient Pre-hospital Living Situation: Home  Patient Pre-hospital Level of Mobility: walks  Patient Pre-hospital Diet Restrictions: None  Substance Use History:   Social History     Substance and Sexual Activity   Alcohol Use Yes     Social History     Tobacco Use   Smoking Status Every Day   • Packs/day: 0.25   • Types: Cigarettes   Smokeless Tobacco Never     Social History     Substance and Sexual Activity   Drug Use Yes • Types: Marijuana       Family History:  History reviewed. No pertinent family history. Physical Exam:     Vitals:   Blood Pressure: 117/86 (09/23/23 2330)  Pulse: 84 (09/23/23 2330)  Temperature: 99.2 °F (37.3 °C) (09/23/23 1930)  Temp Source: Oral (09/23/23 1930)  Respirations: 18 (09/23/23 2330)  Height: 5' 2" (157.5 cm) (09/24/23 0053)  Weight - Scale: 74.2 kg (163 lb 9.3 oz) (09/24/23 0053)  SpO2: 98 % (09/23/23 2330)    Physical Exam  Vitals and nursing note reviewed. Constitutional:       General: He is not in acute distress. Appearance: He is well-developed. He is ill-appearing. HENT:      Head: Normocephalic and atraumatic. Nose: Nose normal. No congestion. Mouth/Throat:      Mouth: Mucous membranes are moist.      Pharynx: Oropharynx is clear. Eyes:      Extraocular Movements: Extraocular movements intact. Pupils: Pupils are equal, round, and reactive to light. Comments: EOM painful, but intact. Right eye swelling. See picture in media. Cardiovascular:      Rate and Rhythm: Normal rate and regular rhythm. Heart sounds: Normal heart sounds. No murmur heard. No friction rub. No gallop. Pulmonary:      Effort: Pulmonary effort is normal. No respiratory distress. Breath sounds: Normal breath sounds. No wheezing, rhonchi or rales. Abdominal:      General: Bowel sounds are normal. There is no distension. Palpations: Abdomen is soft. Tenderness: There is no abdominal tenderness. Musculoskeletal:      Cervical back: Neck supple. Right lower leg: No edema. Left lower leg: No edema. Skin:     General: Skin is warm and dry. Capillary Refill: Capillary refill takes less than 2 seconds. Findings: Lesion (Scab on right nose with surrounding erythema and swelling into R eye region. See picture in media) present. Neurological:      General: No focal deficit present.       Mental Status: He is alert and oriented to person, place, and time. Mental status is at baseline. Psychiatric:         Mood and Affect: Mood normal.         Behavior: Behavior normal.          Additional Data:     Lab Results:  Results from last 7 days   Lab Units 09/23/23 2012   WBC Thousand/uL 13.49*   HEMOGLOBIN g/dL 13.1   HEMATOCRIT % 41.8   PLATELETS Thousands/uL 240   NEUTROS PCT % 65   LYMPHS PCT % 26   MONOS PCT % 7   EOS PCT % 2     Results from last 7 days   Lab Units 09/23/23 2012   SODIUM mmol/L 140   POTASSIUM mmol/L 4.5   CHLORIDE mmol/L 102   CO2 mmol/L 33*   BUN mg/dL 8   CREATININE mg/dL 1.21   ANION GAP mmol/L 5   CALCIUM mg/dL 9.3   GLUCOSE RANDOM mg/dL 93                 Results from last 7 days   Lab Units 09/24/23  0014 09/23/23 2012   LACTIC ACID mmol/L 0.7  --    PROCALCITONIN ng/ml  --  0.05       Lines/Drains:  Invasive Devices     Peripheral Intravenous Line  Duration           Peripheral IV 09/23/23 Left Antecubital <1 day    Peripheral IV 09/23/23 Right Antecubital <1 day                    Imaging: Reviewed radiology reports from this admission including: CT facial bones  CT facial bones with contrast   Final Result by Shayla Reed DO (09/23 2226)      Small rim-enhancing collection in the right paranasal soft tissues (axial image 44, series 3) measures approximately 1.1 cm in size, suspicious for small abscess. There are surrounding inflammatory changes and fat stranding in this region which extends    along the anterior right premaxillary region and the right inferior periorbital region suggesting associated premaxillary and preseptal cellulitis. No discrete evidence of post septal cellulitis. Shotty right submandibular and cervical chain lymph nodes, possibly reactive. Other findings as above. The study was marked in [facility] for immediate notification. Workstation performed: [id number]             EKG and Other Studies Reviewed on Admission:   · EKG: No EKG obtained.     ** Please Note: This note has been constructed using a voice recognition system.  **

## 2023-09-24 NOTE — QUICK NOTE
Patient has two large necklaces with pendants, a bracelet, and a ring all of which seem valuable. RN offered to have security take the jewelry and put it in a safe, but the patient refused. RN also suggested that the patient's wife take the jewelry home to which the patient also refused. RN gave the patient a denture cup to place all of his jewelry in. The jewelry remains in the patient's possession at bedside. RN passed on in report to morning RN of the patient's belongings.

## 2023-09-24 NOTE — PLAN OF CARE
Problem: SKIN/TISSUE INTEGRITY - ADULT  Goal: Skin Integrity remains intact(Skin Breakdown Prevention)  Description: Assess:  -Perform Hector assessment every shift  -Clean and moisturize skin every shift  -Inspect skin when repositioning, toileting, and assisting with ADLS  -Assess extremities for adequate circulation and sensation     Bed Management:  -Have minimal linens on bed & keep smooth, unwrinkled  -Change linens as needed when moist or perspiring    Toileting:  -Offer bedside commode  -Assess for incontinence   -Use incontinent care products after each incontinent episode such as bed pad    Activity:  -Mobilize patient 3 times a day  -Encourage activity and walks on unit  -Encourage or provide ROM exercises   -Turn and reposition patient every 2 Hours  -Use appropriate equipment to lift or move patient in bed  -Instruct/ Assist with weight shifting every 30 minutes when out of bed in chair  -Consider limitation of chair time 2 hour intervals    Skin Care:  -Avoid use of baby powder, tape, friction and shearing, hot water or constrictive clothing  -Relieve pressure over bony prominences using  -Do not massage red bony areas    Outcome: Progressing  Goal: Incision(s), wounds(s) or drain site(s) healing without S/S of infection  Description: INTERVENTIONS  - Assess and document dressing, incision, wound bed, drain sites and surrounding tissue  - Provide patient and family education  - Perform skin care/dressing changes every   Problem: INFECTION - ADULT  Goal: Absence or prevention of progression during hospitalization  Description: INTERVENTIONS:  - Assess and monitor for signs and symptoms of infection  - Monitor lab/diagnostic results  - Monitor all insertion sites, i.e. indwelling lines, tubes, and drains  - Monitor endotracheal if appropriate and nasal secretions for changes in amount and color  - [place] appropriate cooling/warming therapies per order  - Administer medications as ordered  - Instruct and encourage patient and family to use good hand hygiene technique  - Identify and instruct in appropriate isolation precautions for identified infection/condition  Outcome: Progressing     Outcome: Progressing

## 2023-09-24 NOTE — ED NOTES
Handoff form sent at this time to assigned floor nurse Dave Montero RN) via [employer].      [facility] Sites  94/[id number]

## 2023-09-24 NOTE — CONSULTS
Consultation - ENT   Malina Fonseca 41 y.o. male MRN: [id number]  Unit/Bed#: [place] [address] -01 Encounter: [id number]      Assessment/Plan     Assessment:  Right facial furuncle with associated facial cellulitis. Improving on IV Vanco, Unasyn, and dexamethasone. Plan:  Furuncle has drained. Continue IV abx, and complete 3 doses of dexamethasone. Bactroban ointment and Band-Aid to the furuncle BID. As long as patient continues to improve, he should be OK for d/c on oral abx tomorrow. History of Present Illness   Physician Requesting Consult: Jovita Hodge DO  Reason for Consult / Principal Problem: right facial pain, swelling  HPI: Malina Fonseca is a 41y.o. year old male who presents with pain and swelling in the right malar and periorbital area, since about Wednesday. He reported that it began with an insect bite, that he was scratching. CT revealed local cellulitis and a furuncle.     Consults    Review of Systems    Historical Information   Past Medical History:   Diagnosis Date   • Asthma    • Back pain    • Psychiatric disorder      Past Surgical History:   Procedure Laterality Date   • HAND SURGERY     • NO PAST SURGERIES       Social History   Social History     Substance and Sexual Activity   Alcohol Use Not Currently     Social History     Substance and Sexual Activity   Drug Use Yes   • Types: Marijuana     Social History     Tobacco Use   Smoking Status Former   • Packs/day: 0.25   • Types: Cigarettes   Smokeless Tobacco Never     Family History: non-contributory    Meds/Allergies   all current active meds have been reviewed    Allergies   Allergen Reactions   • Nsaids      Gastritis    • Oxycodone-Acetaminophen    • Prednisone Hives     "puts me in bad mood"   • Percocet [Oxycodone-Acetaminophen] Rash   • Vicodin [Hydrocodone-Acetaminophen] Rash       Objective     No intake or output data in the 24 hours ending 09/24/23 1323    Invasive Devices     Peripheral Intravenous Line  Duration           Peripheral IV 09/23/23 Left Antecubital <1 day    Peripheral IV 09/23/23 Right Antecubital <1 day                Physical Exam  Neck: no thyromegaly nor adenopathy. Nose: draining furuncle just lateral to the nose on the right, with swelling and tenderness in the right malar and periorbital area. Oropharynx: unremarkable. Lab Results: I have personally reviewed pertinent lab results. Imaging Studies: I have personally reviewed pertinent reports. EKG, Pathology, and Other Studies: I have personally reviewed pertinent reports.

## 2023-09-25 VITALS
TEMPERATURE: 98.5 F | RESPIRATION RATE: 16 BRPM | SYSTOLIC BLOOD PRESSURE: 116 MMHG | HEART RATE: 106 BPM | OXYGEN SATURATION: 95 % | DIASTOLIC BLOOD PRESSURE: 78 MMHG | HEIGHT: 62 IN | WEIGHT: 163.58 LBS | BODY MASS INDEX: 30.1 KG/M2

## 2023-09-25 LAB
ATRIAL RATE: 83 BPM
P AXIS: 59 DEGREES
PR INTERVAL: 148 MS
QRS AXIS: 71 DEGREES
QRSD INTERVAL: 82 MS
QT INTERVAL: 346 MS
QTC INTERVAL: 406 MS
T WAVE AXIS: 40 DEGREES
VANCOMYCIN TROUGH SERPL-MCNC: 6.8 UG/ML (ref 10–20)
VENTRICULAR RATE: 83 BPM

## 2023-09-25 PROCEDURE — 80202 ASSAY OF VANCOMYCIN: CPT

## 2023-09-25 PROCEDURE — 99239 HOSP IP/OBS DSCHRG MGMT >30: CPT | Performed by: INTERNAL MEDICINE

## 2023-09-25 PROCEDURE — 93005 ELECTROCARDIOGRAM TRACING: CPT

## 2023-09-25 PROCEDURE — 93010 ELECTROCARDIOGRAM REPORT: CPT | Performed by: INTERNAL MEDICINE

## 2023-09-25 RX ORDER — AMOXICILLIN AND CLAVULANATE POTASSIUM 875; 125 MG/1; MG/1
1 TABLET, FILM COATED ORAL EVERY 12 HOURS SCHEDULED
Qty: 16 TABLET | Refills: 0 | Status: SHIPPED | OUTPATIENT
Start: 2023-09-25 | End: 2023-10-03

## 2023-09-25 RX ADMIN — METOCLOPRAMIDE 10 MG: 5 INJECTION, SOLUTION INTRAMUSCULAR; INTRAVENOUS at 01:19

## 2023-09-25 RX ADMIN — DIPHENHYDRAMINE HYDROCHLORIDE 25 MG: 50 INJECTION, SOLUTION INTRAMUSCULAR; INTRAVENOUS at 09:04

## 2023-09-25 RX ADMIN — BUDESONIDE AND FORMOTEROL FUMARATE DIHYDRATE 2 PUFF: 160; 4.5 AEROSOL RESPIRATORY (INHALATION) at 08:55

## 2023-09-25 RX ADMIN — VANCOMYCIN HYDROCHLORIDE 1250 MG: 5 INJECTION, POWDER, LYOPHILIZED, FOR SOLUTION INTRAVENOUS at 09:14

## 2023-09-25 RX ADMIN — SODIUM CHLORIDE 3 G: 9 INJECTION, SOLUTION INTRAVENOUS at 11:43

## 2023-09-25 RX ADMIN — SODIUM CHLORIDE 3 G: 9 INJECTION, SOLUTION INTRAVENOUS at 05:18

## 2023-09-25 RX ADMIN — METOCLOPRAMIDE 10 MG: 5 INJECTION, SOLUTION INTRAMUSCULAR; INTRAVENOUS at 09:06

## 2023-09-25 RX ADMIN — TRAMADOL HYDROCHLORIDE 50 MG: 50 TABLET, COATED ORAL at 05:18

## 2023-09-25 RX ADMIN — MUPIROCIN: 20 OINTMENT TOPICAL at 08:55

## 2023-09-25 RX ADMIN — MAGNESIUM SULFATE HEPTAHYDRATE 2 G: 40 INJECTION, SOLUTION INTRAVENOUS at 08:55

## 2023-09-25 RX ADMIN — DIPHENHYDRAMINE HYDROCHLORIDE 25 MG: 50 INJECTION, SOLUTION INTRAMUSCULAR; INTRAVENOUS at 01:18

## 2023-09-25 RX ADMIN — ENOXAPARIN SODIUM 40 MG: 40 INJECTION SUBCUTANEOUS at 08:55

## 2023-09-25 NOTE — UTILIZATION REVIEW
Date: 9/25  Day 3: Has surpassed a 2nd midnight with active treatments and services, which include [facility] 9/23 @ 1954, IV antibiotics for R nose and facial cellulitis with furuncle tx. Initial Clinical Review    Admission: Date/Time/Statement:   Admission Orders (From admission, onward)     Ordered        09/23/23 2319  INPATIENT ADMISSION  Once                      Orders Placed This Encounter   Procedures   • INPATIENT ADMISSION     Standing Status:   Standing     Number of Occurrences:   1     Order Specific Question:   Level of Care     Answer:   Med Surg [16]     Order Specific Question:   Estimated length of stay     Answer:   More than 2 Midnights     Order Specific Question:   Certification     Answer:   I certify that inpatient services are medically necessary for this patient for a duration of greater than two midnights. See H&P and MD Progress Notes for additional information about the patient's course of treatment. ED Arrival Information     Expected   -    Arrival   9/23/2023 19:24    Acuity   Urgent            Means of arrival   Walk-In    Escorted by   Self    Service   Hospitalist    Admission type   Emergency            Arrival complaint   Abscess            Chief Complaint   Patient presents with   • Abscess     Reports had something on his nose since age 17 picked at it x2 days ago with redness and swelling since        Initial Presentation: 41 y.o. male presents to the ED on 9/22 from home with c/o facial pain and swelling after scratching long time bump on nose, also had bee sting earlier in month - swelling spread to below his R eye, no vision change but has discomfort when R eye is open. PMH: chronic LBP, anxiety, asthma. In the ED Labs - leukocytosis. Imaging - Small rim-enhancing collection in the right paranasal soft tissues suspicious for small abscess; R premaxillary and preseptal cellulitis. Treated with IV fluids, IV antibiotics, IV Decadron.   On exam no vision changes, R eye swelling, lesion on R nose with erythema and swelling to R eye region. He is admitted to INPATIENT status with sepsis d/t facial cellulitis - trend procal, blood cultures, migraine cocktail for headache, IV fluids, continue IV antibiotics, IV Decadron and ENT consult. 9/24 ENT Consult - Right facial furuncle that has now drained with associated facial cellulitis. Improving on IV Vanco, Unasyn, and dexamethasone x 3 doses. tx w/ Bactroban, bandaid BID to furuncle, can d/c on oral antibiotics. On exam no thyromegaly, adenopathy, draining furuncle lat to R side of nose, swelling, tenderness R malar and periorbital area. Date: 9/25   Day 2:   Leukocytosis resolved. Remains on IV antibiotics, Decadron, Mag repletion, Reglan. IV Vanco d/c. Using PRN Tramadol for back pain.       ED Triage Vitals [09/23/23 1930]   Temperature Pulse Respirations Blood Pressure SpO2   99.2 °F (37.3 °C) 105 18 145/92 100 %      Temp Source Heart Rate Source Patient Position - Orthostatic VS BP Location FiO2 (%)   Oral Monitor Sitting Right arm --      Pain Score       9          Wt Readings from Last 1 Encounters:   09/24/23 74.2 kg (163 lb 9.3 oz)     Additional Vital Signs:   09/25/23 07:28:26 98.5 °F (36.9 °C) -- 16 116/78 91 -- -- --   09/24/23 19:51:15 98.2 °F (36.8 °C) 106 Abnormal  16 112/78 89 95 % None (Room air) Lying   09/24/23 15:18:39 98.5 °F (36.9 °C) 106 Abnormal  18 110/79 89 95 % -- --   09/24/23 0750 -- -- -- -- -- -- None (Room air) --   09/24/23 07:37:24 98 °F (36.7 °C) 92 16 119/81 94 96 % -- --   09/24/23 01:45:26 98 °F (36.7 °C) 84 20 109/71 84 97 % None (Room air) --   09/23/23 2330 -- 84 18 117/86 97 98 % None (Room air) Lying   09/23/23 2211 -- 92 16 127/83 -- 97 % None (Room air) Lying     Pertinent Labs/Diagnostic Test Results:     9/24 ECG x2  - Normal sinus rhythm    CT facial bones with contrast   Final Result by Marcelina Masterson DO (09/23 2226)      Small rim-enhancing collection in the right paranasal soft tissues (axial image 44, series 3) measures approximately 1.1 cm in size, suspicious for small abscess. There are surrounding inflammatory changes and fat stranding in this region which extends    along the anterior right premaxillary region and the right inferior periorbital region suggesting associated premaxillary and preseptal cellulitis. No discrete evidence of post septal cellulitis. Shotty right submandibular and cervical chain lymph nodes, possibly reactive. Results from last 7 days   Lab Units 09/24/23  0531 09/23/23 2012   WBC Thousand/uL 8.80 13.49*   HEMOGLOBIN g/dL 13.2 13.1   HEMATOCRIT % 41.5 41.8   PLATELETS Thousands/uL 245 240   NEUTROS ABS Thousands/µL 7.69* 8.84*         Results from last 7 days   Lab Units 09/24/23  0531 09/23/23 2012   SODIUM mmol/L 136 140   POTASSIUM mmol/L 4.0 4.5   CHLORIDE mmol/L 103 102   CO2 mmol/L 24 33*   ANION GAP mmol/L 9 5   BUN mg/dL 9 8   CREATININE mg/dL 0.90 1.21   EGFR ml/min/1.73sq m 105 73   CALCIUM mg/dL 9.0 9.3   MAGNESIUM mg/dL 2.1  --              Results from last 7 days   Lab Units 09/24/23  0531 09/23/23 2012   GLUCOSE RANDOM mg/dL 219* 93     Results from last 7 days   Lab Units 09/24/23  0531 09/23/23 2012   PROCALCITONIN ng/ml 0.06 0.05     Results from last 7 days   Lab Units 09/24/23  0014   LACTIC ACID mmol/L 0.7     Results from last 7 days   Lab Units 09/23/23  2326   BLOOD CULTURE  No Growth at 24 hrs. No Growth at 24 hrs.              Results from last 7 days   Lab Units 09/25/23  0506   VANCOMYCIN TR ug/mL 6.8*       ED Treatment:   Medication Administration from 09/23/2023 1924 to 09/24/2023 0051       Date/Time Order Dose Route Action     09/23/2023 2132 EDT iohexol (OMNIPAQUE) 350 MG/ML injection (MULTI-DOSE) 100 mL 100 mL Intravenous Given     09/23/2023 2327 EDT dexamethasone (PF) (DECADRON) injection 10 mg 10 mg Intravenous Given     09/23/2023 2342 EDT ampicillin-sulbactam (UNASYN) 3 g in sodium chloride 0.9 % 100 mL IVPB 3 g Intravenous New Bag     09/23/2023 2340 EDT multi-electrolyte (ISOLYTE-S PH 7.4) bolus 1,000 mL 1,000 mL Intravenous New Bag        Past Medical History:   Diagnosis Date   • Asthma    • Back pain    • Psychiatric disorder      Admitting Diagnosis: Abscess [L02.91]  Facial cellulitis [M27.725]  Age/Sex: 41 y.o. male  Admission Orders:  Scheduled Medications:  ampicillin-sulbactam, 3 g, Intravenous, Q6H  budesonide-formoterol, 2 puff, Inhalation, BID  diphenhydrAMINE, 25 mg, Intravenous, Q8H ARIANA  enoxaparin, 40 mg, Subcutaneous, Daily  magnesium sulfate, 2 g, Intravenous, Q24H ARIANA  metoclopramide, 10 mg, Intravenous, Q8H [address]  mupirocin, , Topical, BID  nicotine, 1 patch, Transdermal, Daily  vancomycin, 1,250 mg, Intravenous, Q12H      Continuous IV Infusions:  IV fluids @ 125 d/c 9/24     PRN Meds:  albuterol, 2 puff, Inhalation, Q6H PRN - x 1 9/24  ALPRAZolam, 0.5 mg, Oral, TID PRN  aluminum-magnesium hydroxide-simethicone, 30 mL, Oral, Q6H PRN  methocarbamol, 750 mg, Oral, Q6H PRN  ondansetron, 4 mg, Intravenous, Q6H PRN  traMADol, 50 mg, Oral, Q6H PRN - x 2 9/24, x 1 9/25    Wound care daily R maxillary area  Aqua K pad to neck PRN  IP CONSULT TO ENT  IP CONSULT TO PHARMACY    Network Utilization Review Department  ATTENTION: Please call with any questions or concerns to [phone] and carefully listen to the prompts so that you are directed to the right person. All voicemails are confidential.  Suad Palacios all requests for admission clinical reviews, approved or denied determinations and any other requests to dedicated fax number below belonging to the campus where the patient is receiving treatment.  List of dedicated fax numbers for the Facilities:  [place] DENIALS (Administrative/Medical Necessity) [phone] [address] [facility] (Maternity/NICU/Pediatrics) [phone]   [address] [facility]   [address] [facility] [phone]   [address] [phone]   [address] [facility] [address] [place] [address] [phone]   [address] [address] [phone]

## 2023-09-25 NOTE — PLAN OF CARE
Problem: SKIN/TISSUE INTEGRITY - ADULT  Goal: Skin Integrity remains intact(Skin Breakdown Prevention)  Description: Assess:  -Perform Hector assessment every shift  -Clean and moisturize skin every shift  -Inspect skin when repositioning, toileting, and assisting with ADLS  -Assess extremities for adequate circulation and sensation     Bed Management:  -Have minimal linens on bed & keep smooth, unwrinkled  -Change linens as needed when moist or perspiring    Toileting:  -Offer bedside commode  -Assess for incontinence   -Use incontinent care products after each incontinent episode such as bed pad    Activity:  -Mobilize patient 3 times a day  -Encourage activity and walks on unit  -Encourage or provide ROM exercises   -Turn and reposition patient every 2 Hours  -Use appropriate equipment to lift or move patient in bed  -Instruct/ Assist with weight shifting every 30 minutes when out of bed in chair  -Consider limitation of chair time 2 hour intervals    Skin Care:  -Avoid use of baby powder, tape, friction and shearing, hot water or constrictive clothing  -Relieve pressure over bony prominences using  -Do not massage red bony areas    Outcome: Progressing  Goal: Incision(s), wounds(s) or drain site(s) healing without S/S of infection  Description: INTERVENTIONS  - Assess and document dressing, incision, wound bed, drain sites and surrounding tissue  - Provide patient and family education  - Perform skin care/dressing changes every shift  Outcome: Progressing     Problem: INFECTION - ADULT  Goal: Absence or prevention of progression during hospitalization  Description: INTERVENTIONS:  - Assess and monitor for signs and symptoms of infection  - Monitor lab/diagnostic results  - Monitor all insertion sites, i.e. indwelling lines, tubes, and drains  - Monitor endotracheal if appropriate and nasal secretions for changes in amount and color  - Reader appropriate cooling/warming therapies per order  - Administer medications as ordered  - Instruct and encourage patient and family to use good hand hygiene technique  - Identify and instruct in appropriate isolation precautions for identified infection/condition  Outcome: Progressing

## 2023-09-25 NOTE — NURSING NOTE
Pt discharge to home at this time. Pt stated that he understood discharge instructions. No s/s of any distress no c/o pain or discomfort.  Pt reminded to  meds downstairs at [address] before exiting the hospital.

## 2023-09-25 NOTE — UTILIZATION REVIEW
NOTIFICATION OF INPATIENT ADMISSION   AUTHORIZATION REQUEST   SERVICING FACILITY:   [address] [place] [address], [address] [place] [address]  Tax ID: [id number]  NPI: [id number] ATTENDING PROVIDER:  Attending Name and NPI#: Amelia Aguila [[id number]]  Address: [facility], [address] [place] [address]  Phone: [phone]     ADMISSION INFORMATION:  Place of Service: Inpatient [address] [facility]  Place of Service Code: 21  Inpatient Admission Date/Time: 9/23/23 11:19 PM  Discharge Date/Time: No discharge date for patient encounter. Admitting Diagnosis Code/Description:  Abscess [L02.91]  Facial cellulitis [A58.670]     UTILIZATION REVIEW CONTACT:  Darlene Mendoza Utilization   Network Utilization Review Department  Phone: [phone]  Fax [phone]  Email: [email or web address]  Contact for approvals/pending authorizations, clinical reviews, and discharge. PHYSICIAN ADVISORY SERVICES:  Medical Necessity Denial & [id number] Review  Phone: [phone]  Fax: [phone]  Email: [email or web address]

## 2023-09-25 NOTE — PROGRESS NOTES
Annette Escobar is a 41 y.o. male who is currently ordered Vancomycin IV with management by the Pharmacy Consult service. Relevant clinical data and objective / subjective history reviewed. Vancomycin Assessment:  1. Indication and Goal AUC/Trough: Soft tissue (goal -600, trough >10)  2. Clinical Status: stable  3. Micro:       4. Renal Function:  · SCr: 0.9 mg/dL  · CrCl:95.3 mL/min  · Renal replacement: Not on dialysis  5. Days of Therapy: 2  Vancomycin Plan:  1. New Dosinmg iv 12h   2. Estimated AUC: 442 mcg*hr/mL  3. Estimated Trough: 11.5 mcg/mL  4. Next Level:  random with am labs  5. Renal Function Monitoring: Daily BMP and [place] will continue to follow closely for s/sx of nephrotoxicity, infusion reactions and appropriateness of therapy. BMP and CBC will be ordered per protocol. We will continue to follow the patient’s culture results and clinical progress daily.     Adina Duncan, Pharmacist

## 2023-09-25 NOTE — DISCHARGE SUMMARY
[facility]  Discharge- Indira [place] 1980, 41 y.o. male MRN: [id number]  Unit/Bed#: [address] [place] 219-01 Encounter: [id number]  Primary Care Provider: Karon Sanchez PA-C   Date and time admitted to hospital: 9/23/2023  7:50 PM    * Sepsis secondary to facial cellulitis [facility])  Assessment & Plan  • Patient presented with increased swelling/pain in the right maxillary region  • CT with small rim-enhancing collection in the right paranasal soft tissues suspicious for small abscess with associated preseptal cellulitis. • ENT was contacted in the ED and patient was started on IV Decadron as well as IV antibiotics  • Patient was seen in person and noted to have draining furuncle  • Patient clinically improved on IV antibiotics, recommendation for Bactroban ointment and Band-Aid twice daily  • Discharge home with an additional 8 days of antibiotics complete 10-day course. Discharged on Augmentin. Anxiety  Assessment & Plan  · Managed with Xanax as needed    Chronic low back pain  Assessment & Plan  · Utilizes tramadol    Moderate persistent asthma without complication  Assessment & Plan  · No acute exacerbation  · Continue home inhaler regimen      Discharging Physician / Practitioner: Christine Merino DO  PCP: Karon Sanchez PA-C  Admission Date:   Admission Orders (From admission, onward)     Ordered        09/23/23 2319  INPATIENT ADMISSION  Once                      Discharge Date: 09/25/23    Medical Problems     Resolved Problems  Date Reviewed: 9/24/2023   None         Consultations During Hospital Stay:   Deacon Sood TO ENT    Procedures Performed: None    Significant Findings / Test Results:     CT facial bones with contrast    Result Date: 9/23/2023  Impression: Small rim-enhancing collection in the right paranasal soft tissues (axial image 44, series 3) measures approximately 1.1 cm in size, suspicious for small abscess.  There are surrounding inflammatory changes and fat stranding in this region which extends along the anterior right premaxillary region and the right inferior periorbital region suggesting associated premaxillary and preseptal cellulitis. No discrete evidence of post septal cellulitis. Shotty right submandibular and cervical chain lymph nodes, possibly reactive. Other findings as above. The study was marked in [facility] for immediate notification. Workstation performed: [id number]       Test Results Pending at Discharge (will require follow up): None     Outpatient Tests Requested: None    Reason for Admission: Facial Cellulitis     Hospital Course:     Taina Funez is a 41 y.o. male With past medical history of asthma, anxiety, chronic pain who presents with facial swelling. Ct with concern for paranasal abscess. He was started on empiric antibiotics and IV decadron with rapid improvement. Abscess started spontaneously draining. Discharged home on oral antiboitics. Please see above list of diagnoses and related plan for additional information. Condition at Discharge: stable     Discharge Day Visit / Exam:     Subjective: Patient feels improved. Vitals: Blood Pressure: 116/78 (09/25/23 0728)  Pulse: (!) 106 (09/24/23 1951)  Temperature: 98.5 °F (36.9 °C) (09/25/23 0728)  Temp Source: Oral (09/24/23 1951)  Respirations: 16 (09/25/23 0728)  Height: 5' 2" (157.5 cm) (09/24/23 0053)  Weight - Scale: 74.2 kg (163 lb 9.3 oz) (09/24/23 0053)  SpO2: 95 % (09/24/23 1951)  Exam:   Physical Exam  Vitals reviewed. Constitutional:       General: He is not in acute distress. Appearance: He is well-developed. He is not ill-appearing, toxic-appearing or diaphoretic. HENT:      Head: Normocephalic and atraumatic. Comments: Scab on nose with some surrounding erythema      Mouth/Throat:      Mouth: Mucous membranes are moist.   Eyes:      General: No scleral icterus. Extraocular Movements: Extraocular movements intact. Cardiovascular:      Rate and Rhythm: Normal rate and regular rhythm. Heart sounds: Normal heart sounds. Pulmonary:      Effort: Pulmonary effort is normal. No respiratory distress. Breath sounds: Normal breath sounds. No wheezing or rales. Abdominal:      General: There is no distension. Palpations: Abdomen is soft. Tenderness: There is no abdominal tenderness. There is no guarding or rebound. Musculoskeletal:         General: No swelling, tenderness or deformity. Skin:     General: Skin is warm and dry. Neurological:      General: No focal deficit present. Mental Status: He is alert. Mental status is at baseline. Psychiatric:         Mood and Affect: Mood normal.         Behavior: Behavior normal.         Thought Content: Thought content normal.         Judgment: Judgment normal.           Discharge instructions/Information to patient and family:   See after visit summary for information provided to patient and family. Provisions for Follow-Up Care:  See after visit summary for information related to follow-up care and any pertinent home health orders. Disposition:     Home     Discharge Statement:  I spent 60 minutes discharging the patient. This time was spent on the day of discharge. I had direct contact with the patient on the day of discharge. Greater than 50% of the total time was spent examining patient, answering all patient questions, arranging and discussing plan of care with patient as well as directly providing post-discharge instructions. Additional time then spent on discharge activities. Discharge Medications:  See after visit summary for reconciled discharge medications provided to patient and family.       ** Please Note: This note has been constructed using a voice recognition system **

## 2023-09-25 NOTE — PLAN OF CARE
Problem: SKIN/TISSUE INTEGRITY - ADULT  Goal: Skin Integrity remains intact(Skin Breakdown Prevention)  Description: Assess:  -Perform Hector assessment every shift  -Clean and moisturize skin every shift  -Inspect skin when repositioning, toileting, and assisting with ADLS  -Assess extremities for adequate circulation and sensation     Bed Management:  -Have minimal linens on bed & keep smooth, unwrinkled  -Change linens as needed when moist or perspiring    Toileting:  -Offer bedside commode  -Assess for incontinence   -Use incontinent care products after each incontinent episode such as bed pad    Activity:  -Mobilize patient 3 times a day  -Encourage activity and walks on unit  -Encourage or provide ROM exercises   -Turn and reposition patient every 2 Hours  -Use appropriate equipment to lift or move patient in bed  -Instruct/ Assist with weight shifting every 30 minutes when out of bed in chair  -Consider limitation of chair time 2 hour intervals    Skin Care:  -Avoid use of baby powder, tape, friction and shearing, hot water or constrictive clothing  -Relieve pressure over bony prominences using  -Do not massage red bony areas    Outcome: Progressing     Problem: SKIN/TISSUE INTEGRITY - ADULT  Goal: Incision(s), wounds(s) or drain site(s) healing without S/S of infection  Description: INTERVENTIONS  - Assess and document dressing, incision, wound bed, drain sites and surrounding tissue  - Provide patient and family education  - Perform skin care/dressing changes every shift  Outcome: Progressing     Problem: INFECTION - ADULT  Goal: Absence or prevention of progression during hospitalization  Description: INTERVENTIONS:  - Assess and monitor for signs and symptoms of infection  - Monitor lab/diagnostic results  - Monitor all insertion sites, i.e. indwelling lines, tubes, and drains  - Monitor endotracheal if appropriate and nasal secretions for changes in amount and color  - [place] appropriate cooling/warming therapies per order  - Administer medications as ordered  - Instruct and encourage patient and family to use good hand hygiene technique  - Identify and instruct in appropriate isolation precautions for identified infection/condition  Outcome: Progressing

## 2023-09-25 NOTE — ASSESSMENT & PLAN NOTE
• Patient presented with increased swelling/pain in the right maxillary region  • CT with small rim-enhancing collection in the right paranasal soft tissues suspicious for small abscess with associated preseptal cellulitis. • ENT was contacted in the ED and patient was started on IV Decadron as well as IV antibiotics  • Patient was seen in person and noted to have draining furuncle  • Patient clinically improved on IV antibiotics, recommendation for Bactroban ointment and Band-Aid twice daily  • Discharge home with an additional 8 days of antibiotics complete 10-day course. Discharged on Augmentin.

## 2023-09-27 NOTE — UTILIZATION REVIEW
NOTIFICATION OF ADMISSION DISCHARGE   This is a Notification of Discharge from [address] [place] [address]. Please be advised that this patient has been discharge from our facility. Below you will find the admission and discharge date and time including the patient’s disposition. UTILIZATION REVIEW CONTACT:  Jonathan Alanis  Utilization   Network Utilization Review Department  Phone: [phone] carefully listen to the prompts. All voicemails are confidential.  Email: [email or web address]     ADMISSION INFORMATION  PRESENTATION DATE: 9/23/2023  7:50 PM  OBERVATION ADMISSION DATE:   INPATIENT ADMISSION DATE: 9/23/23 11:19 PM   DISCHARGE DATE: 9/25/2023 12:45 PM   DISPOSITION:Home/Self Care    IMPORTANT INFORMATION:  Send all requests for admission clinical reviews, approved or denied determinations and any other requests to dedicated fax number below belonging to the campus where the patient is receiving treatment.  List of dedicated fax numbers:  [place] DENIALS (Administrative/Medical Necessity) [phone] [address] [facility] (Maternity/NICU/Pediatrics) [phone]   [facility] [phone]   [facility] [phone] [address] [facility] [phone]   [address] [place] [address] [phone]   [facility] [phone]   [address] [place] [address] [phone]   [address] [place] [address] [phone] [address] [facility] [phone] [address] [facility] [address] [facility] [phone]

## 2023-09-29 LAB
BACTERIA BLD CULT: NORMAL
BACTERIA BLD CULT: NORMAL